# Patient Record
Sex: FEMALE | Race: WHITE | Employment: FULL TIME | URBAN - METROPOLITAN AREA
[De-identification: names, ages, dates, MRNs, and addresses within clinical notes are randomized per-mention and may not be internally consistent; named-entity substitution may affect disease eponyms.]

---

## 2017-04-17 ENCOUNTER — APPOINTMENT (OUTPATIENT)
Dept: ULTRASOUND IMAGING | Facility: HOSPITAL | Age: 27
End: 2017-04-17
Attending: NURSE PRACTITIONER
Payer: COMMERCIAL

## 2017-04-17 ENCOUNTER — HOSPITAL ENCOUNTER (OUTPATIENT)
Age: 27
Discharge: OTHER INSTITUTION | End: 2017-04-17
Attending: EMERGENCY MEDICINE
Payer: COMMERCIAL

## 2017-04-17 ENCOUNTER — APPOINTMENT (OUTPATIENT)
Dept: CT IMAGING | Facility: HOSPITAL | Age: 27
End: 2017-04-17
Attending: NURSE PRACTITIONER
Payer: COMMERCIAL

## 2017-04-17 ENCOUNTER — HOSPITAL ENCOUNTER (EMERGENCY)
Facility: HOSPITAL | Age: 27
Discharge: HOME | End: 2017-04-17
Payer: COMMERCIAL

## 2017-04-17 VITALS
TEMPERATURE: 98 F | OXYGEN SATURATION: 97 % | HEIGHT: 63 IN | RESPIRATION RATE: 19 BRPM | DIASTOLIC BLOOD PRESSURE: 78 MMHG | HEART RATE: 98 BPM | BODY MASS INDEX: 34.73 KG/M2 | WEIGHT: 196 LBS | SYSTOLIC BLOOD PRESSURE: 121 MMHG

## 2017-04-17 VITALS
SYSTOLIC BLOOD PRESSURE: 115 MMHG | HEIGHT: 63 IN | TEMPERATURE: 98 F | WEIGHT: 196 LBS | DIASTOLIC BLOOD PRESSURE: 67 MMHG | OXYGEN SATURATION: 99 % | BODY MASS INDEX: 34.73 KG/M2 | RESPIRATION RATE: 18 BRPM | HEART RATE: 91 BPM

## 2017-04-17 DIAGNOSIS — R10.9 LEFT SIDED ABDOMINAL PAIN OF UNKNOWN CAUSE: Primary | ICD-10-CM

## 2017-04-17 DIAGNOSIS — R10.9 ABDOMINAL PAIN, ACUTE: Primary | ICD-10-CM

## 2017-04-17 PROCEDURE — 99285 EMERGENCY DEPT VISIT HI MDM: CPT

## 2017-04-17 PROCEDURE — 93975 VASCULAR STUDY: CPT

## 2017-04-17 PROCEDURE — 87491 CHLMYD TRACH DNA AMP PROBE: CPT | Performed by: NURSE PRACTITIONER

## 2017-04-17 PROCEDURE — 96374 THER/PROPH/DIAG INJ IV PUSH: CPT

## 2017-04-17 PROCEDURE — 85025 COMPLETE CBC W/AUTO DIFF WBC: CPT | Performed by: NURSE PRACTITIONER

## 2017-04-17 PROCEDURE — 81025 URINE PREGNANCY TEST: CPT

## 2017-04-17 PROCEDURE — 81003 URINALYSIS AUTO W/O SCOPE: CPT | Performed by: NURSE PRACTITIONER

## 2017-04-17 PROCEDURE — 74176 CT ABD & PELVIS W/O CONTRAST: CPT

## 2017-04-17 PROCEDURE — 81002 URINALYSIS NONAUTO W/O SCOPE: CPT

## 2017-04-17 PROCEDURE — 99205 OFFICE O/P NEW HI 60 MIN: CPT

## 2017-04-17 PROCEDURE — 76830 TRANSVAGINAL US NON-OB: CPT

## 2017-04-17 PROCEDURE — 87591 N.GONORRHOEAE DNA AMP PROB: CPT | Performed by: NURSE PRACTITIONER

## 2017-04-17 PROCEDURE — 80048 BASIC METABOLIC PNL TOTAL CA: CPT | Performed by: NURSE PRACTITIONER

## 2017-04-17 PROCEDURE — 76856 US EXAM PELVIC COMPLETE: CPT

## 2017-04-17 RX ORDER — HYDROCODONE BITARTRATE AND ACETAMINOPHEN 5; 325 MG/1; MG/1
1-2 TABLET ORAL EVERY 4 HOURS PRN
Qty: 20 TABLET | Refills: 0 | Status: SHIPPED | OUTPATIENT
Start: 2017-04-17 | End: 2017-04-24

## 2017-04-17 RX ORDER — HYDROCODONE BITARTRATE AND ACETAMINOPHEN 5; 325 MG/1; MG/1
1 TABLET ORAL ONCE
Status: COMPLETED | OUTPATIENT
Start: 2017-04-17 | End: 2017-04-17

## 2017-04-17 RX ORDER — KETOROLAC TROMETHAMINE 30 MG/ML
30 INJECTION, SOLUTION INTRAMUSCULAR; INTRAVENOUS ONCE
Status: COMPLETED | OUTPATIENT
Start: 2017-04-17 | End: 2017-04-17

## 2017-04-17 RX ORDER — KETOROLAC TROMETHAMINE 30 MG/ML
INJECTION, SOLUTION INTRAMUSCULAR; INTRAVENOUS
Status: COMPLETED
Start: 2017-04-17 | End: 2017-04-17

## 2017-04-17 NOTE — ED NOTES
Received pt via ambulatory. Pt is A&OX4. Pt presents with LLQ pain x1 day. Last BM 4/17/17. Pt denies n/v/d. PT denies urinary complaints, or vaginal d/c. Pt resting comfortably in bed. NAD. Will continue to monitor.

## 2017-04-17 NOTE — ED PROVIDER NOTES
Patient Seen in: [facility]    History   Patient presents with:  Urinary Symptoms (urologic)    Stated Complaint: HEMATURIA/SIDE PAIN    HPI    Patient is here with mom.   Patient reports 1 week of left-sided abdominal derek Normocephalic and atraumatic. Right Ear: External ear normal.   Left Ear: External ear normal.   Eyes: Conjunctivae and EOM are normal.   Neck: Neck supple. No tracheal deviation present.    Cardiovascular: Normal rate, regular rhythm, normal heart sounds

## 2017-04-17 NOTE — ED NOTES
Pt being sent to ED. Report given to Britney Solano, triage RN at [address] [place] [address].

## 2017-04-17 NOTE — ED PROVIDER NOTES
Patient Seen in: [facility] AND [facility] Emergency Department    History   Patient presents with:  Abdomen/Flank Pain (GI/)    Stated Complaint: hematuria     HPI    Patient is here with mom.  Patient reports 1 week of left-sided abdominal pain flank to mid. 03/15/2017 (Exact Date)        Physical Exam   Constitutional: She is oriented to person, place, and time. She appears well-developed and well-nourished. HENT:   Head: Normocephalic and atraumatic.    Eyes: Conjunctivae and EOM are normal. Pupils are [place] RAINBOW DRAW DARK GREEN   RAINBOW DRAW LAVENDER TALL (BNP)   CHLAMYDIA/GONOCOCCUS, ROSLYN       MDM           US PELVIS EV W DOPPLER (OJC=[id number])+(CRD [id number]) (Final result) Result time: 04/17/17 15:34:03     Final result by Gunner Cohen MD (04/17/ cause  (primary encounter diagnosis)    Disposition:  Discharge    Follow-up:  Cherie Vera, [phone]    Schedule an appointment as soon as possible for a visit in 2 days  If symptoms worsen return to the ER      Me

## 2017-04-17 NOTE — ED INITIAL ASSESSMENT (HPI)
Hematuria started last night. +left flank pain intermittent x1 week. +nausea. Pt denies v/d. Pt denies pain with urination. Denies fevers.

## 2017-04-21 ENCOUNTER — TELEPHONE (OUTPATIENT)
Dept: GASTROENTEROLOGY | Facility: CLINIC | Age: 27
End: 2017-04-21

## 2017-04-21 ENCOUNTER — OFFICE VISIT (OUTPATIENT)
Dept: INTERNAL MEDICINE CLINIC | Facility: CLINIC | Age: 27
End: 2017-04-21

## 2017-04-21 VITALS
HEIGHT: 63 IN | HEART RATE: 99 BPM | DIASTOLIC BLOOD PRESSURE: 60 MMHG | BODY MASS INDEX: 34.55 KG/M2 | WEIGHT: 195 LBS | TEMPERATURE: 98 F | SYSTOLIC BLOOD PRESSURE: 94 MMHG | OXYGEN SATURATION: 98 %

## 2017-04-21 DIAGNOSIS — Z80.0 FAMILY HISTORY OF COLON CANCER: ICD-10-CM

## 2017-04-21 DIAGNOSIS — N28.1 PARAPELVIC RENAL CYST: ICD-10-CM

## 2017-04-21 DIAGNOSIS — I88.0 MESENTERIC ADENITIS: Primary | ICD-10-CM

## 2017-04-21 DIAGNOSIS — K52.9 COLITIS: ICD-10-CM

## 2017-04-21 PROCEDURE — 80076 HEPATIC FUNCTION PANEL: CPT | Performed by: INTERNAL MEDICINE

## 2017-04-21 PROCEDURE — 99203 OFFICE O/P NEW LOW 30 MIN: CPT | Performed by: INTERNAL MEDICINE

## 2017-04-21 PROCEDURE — 85652 RBC SED RATE AUTOMATED: CPT | Performed by: INTERNAL MEDICINE

## 2017-04-21 RX ORDER — DESOGESTREL AND ETHINYL ESTRADIOL 0.15-0.03
KIT ORAL
Refills: 3 | COMMUNITY
Start: 2017-03-29 | End: 2018-07-10

## 2017-04-21 RX ORDER — METRONIDAZOLE 500 MG/1
500 TABLET ORAL 3 TIMES DAILY
Qty: 21 TABLET | Refills: 0 | Status: SHIPPED | OUTPATIENT
Start: 2017-04-21 | End: 2017-04-28

## 2017-04-21 RX ORDER — CIPROFLOXACIN 500 MG/1
500 TABLET, FILM COATED ORAL 2 TIMES DAILY
Qty: 14 TABLET | Refills: 0 | Status: SHIPPED | OUTPATIENT
Start: 2017-04-21 | End: 2017-04-28

## 2017-04-21 NOTE — TELEPHONE ENCOUNTER
Spoke with pt. Referred for abdominal pain and abnormal CT scan copied below. Pt can only do morning and wants to see MD only. added to schedule next week      CONCLUSION:   1. Normal size kidneys. No renal calculi or significant perinephric stranding.

## 2017-04-21 NOTE — TELEPHONE ENCOUNTER
Pt called to request appt sooner than first available with GS for abdominal pain for last 2 wks. Pt saw Dr. Shruti Tidwell today and was told to be seen as soon as possible. Pt was in ER on 4/17/17 and had testing done. Please call.   Pt only is willing to se

## 2017-04-22 NOTE — PROGRESS NOTES
Larry White is a 27year old female.     Chief complaint:  [facility] follow up /abdominal pain   HPI:     27year old female with PMH as listed below here for [facility] follow up and abdominal pain  Patient reports pain  Started around 3-4 weeks ago   Started wit normocephalic,ears and throat are clear  NECK: supple,no adenopathy  LUNGS: clear to auscultation  CARDIO: RRR without murmur  GI: good BS's,no masses, HSM or tenderness  EXTREMITIES: no cyanosis, clubbing or edema  NEURO: no gross deficits             APR Hepatic Function Panel (7) [E]; Future  - Sed Rate, Westergren (Automated) [E]  - Hepatic Function Panel (7) [E]  - Clostridium difficile(toxigenic)PCR [E]; Future  - Hepatitis Panel, Acute (4) [E]; Future    3.  Family history of colon cancer  GI referral

## 2017-04-24 ENCOUNTER — TELEPHONE (OUTPATIENT)
Dept: INTERNAL MEDICINE CLINIC | Facility: CLINIC | Age: 27
End: 2017-04-24

## 2017-04-25 ENCOUNTER — TELEPHONE (OUTPATIENT)
Dept: INTERNAL MEDICINE CLINIC | Facility: CLINIC | Age: 27
End: 2017-04-25

## 2017-04-25 DIAGNOSIS — R74.8 ELEVATED LIVER ENZYMES: Primary | ICD-10-CM

## 2017-04-25 NOTE — TELEPHONE ENCOUNTER
Patient has appointment with GI on Thursday. She is having the labs drawn tomorrow after class and her abdominal pain has not gone away for now she is managing it.

## 2017-04-26 ENCOUNTER — LAB ENCOUNTER (OUTPATIENT)
Dept: LAB | Age: 27
End: 2017-04-26
Attending: INTERNAL MEDICINE
Payer: COMMERCIAL

## 2017-04-26 DIAGNOSIS — R74.8 ELEVATED LIVER ENZYMES: ICD-10-CM

## 2017-04-26 DIAGNOSIS — K52.9 COLITIS: ICD-10-CM

## 2017-04-26 DIAGNOSIS — I88.0 MESENTERIC ADENITIS: ICD-10-CM

## 2017-04-26 DIAGNOSIS — Z80.0 FAMILY HISTORY OF COLON CANCER: ICD-10-CM

## 2017-04-26 DIAGNOSIS — N28.1 PARAPELVIC RENAL CYST: ICD-10-CM

## 2017-04-26 PROCEDURE — 86664 EPSTEIN-BARR NUCLEAR ANTIGEN: CPT

## 2017-04-26 PROCEDURE — 80076 HEPATIC FUNCTION PANEL: CPT

## 2017-04-26 PROCEDURE — 86645 CMV ANTIBODY IGM: CPT

## 2017-04-26 PROCEDURE — 36415 COLL VENOUS BLD VENIPUNCTURE: CPT

## 2017-04-26 PROCEDURE — 86256 FLUORESCENT ANTIBODY TITER: CPT

## 2017-04-26 PROCEDURE — 86665 EPSTEIN-BARR CAPSID VCA: CPT

## 2017-04-26 PROCEDURE — 80074 ACUTE HEPATITIS PANEL: CPT

## 2017-04-26 PROCEDURE — 86039 ANTINUCLEAR ANTIBODIES (ANA): CPT

## 2017-04-26 PROCEDURE — 86038 ANTINUCLEAR ANTIBODIES: CPT

## 2017-04-27 ENCOUNTER — OFFICE VISIT (OUTPATIENT)
Dept: GASTROENTEROLOGY | Facility: CLINIC | Age: 27
End: 2017-04-27

## 2017-04-27 VITALS
TEMPERATURE: 98 F | HEIGHT: 63 IN | HEART RATE: 50 BPM | DIASTOLIC BLOOD PRESSURE: 66 MMHG | WEIGHT: 192 LBS | RESPIRATION RATE: 16 BRPM | SYSTOLIC BLOOD PRESSURE: 94 MMHG | BODY MASS INDEX: 34.02 KG/M2

## 2017-04-27 DIAGNOSIS — R74.8 ELEVATED LIVER ENZYMES: ICD-10-CM

## 2017-04-27 DIAGNOSIS — R10.12 LEFT UPPER QUADRANT PAIN: Primary | ICD-10-CM

## 2017-04-27 DIAGNOSIS — R93.3 ABNORMAL CT SCAN, COLON: ICD-10-CM

## 2017-04-27 PROCEDURE — 99213 OFFICE O/P EST LOW 20 MIN: CPT | Performed by: INTERNAL MEDICINE

## 2017-04-27 PROCEDURE — 99203 OFFICE O/P NEW LOW 30 MIN: CPT | Performed by: INTERNAL MEDICINE

## 2017-04-28 NOTE — PATIENT INSTRUCTIONS
1.  Complete antibiotics. 2.  We should repeat blood test next week. 3.  Please contact me with any changes.

## 2017-04-28 NOTE — PROGRESS NOTES
HPI:    Patient ID: Rosita Martin is a 27year old female. HPI  The patient is seen today with her mother at the request of Dr. Laurent Sarabia. She embarked on dietary changes at the advice of her  which included a fast during the day.   A years.    She does not smoke cigarettes. She smokes cannabis on a daily basis. No alcohol. Her father  of small cell cancer. Grandfather had colon cancer in his 62s. A great aunt and great grandfather had colon cancer.   Grandmother had breast ca reviewed.     Component      Latest Ref Rng 4/26/2017 4/21/2017   WBC      4.0-11.0 K/UL     RBC      3.70-5.40 M/UL     Hemoglobin      12.0-16.0 g/dL     Hematocrit      35.0-48.0 %     MCV      80.0-100.0 fL     MCH      27.0-32.0 pg     MCHC      32.0-3 RATE      0-20 mm/Hr  14     Component      Latest Ref Rng 4/17/2017   WBC      4.0-11.0 K/UL 9.2   RBC      3.70-5.40 M/UL 4.44   Hemoglobin      12.0-16.0 g/dL 12.7   Hematocrit      35.0-48.0 % 37.5   MCV      80.0-100.0 fL 84.6   MCH      27.0-32.0 pg INDICATIONS: Generalized abdominal pelvic pain. Left sided abdominal pain left mid abdomen and lower quadrant. Worsening left flank pain since yesterday with hematuria last night and this morning.  The   TECHNIQUE: CT images of the abdomen and pelvis were Unremarkable small   bowel loops without focal mass or obstruction. Distal and terminal ileum unremarkable. ABDOMINAL WALL: Small umbilical hernia containing fat   PELVIC NODES: No enlarged mass or adenopathy. PELVIC ORGANS: No visible mass.  Pelvic org Hepatitis serologies are negative. EBV titers are consistent with past infection. CMV titers are pending. CT findings are noted. I discussed with the patient and her mother that I suspect a viral syndrome as the cause of the patient's symptoms.   Sydney Kiran

## 2017-05-05 ENCOUNTER — APPOINTMENT (OUTPATIENT)
Dept: LAB | Facility: HOSPITAL | Age: 27
End: 2017-05-05
Attending: INTERNAL MEDICINE
Payer: COMMERCIAL

## 2017-05-05 DIAGNOSIS — I88.0 MESENTERIC ADENITIS: ICD-10-CM

## 2017-05-05 DIAGNOSIS — Z80.0 FAMILY HISTORY OF COLON CANCER: ICD-10-CM

## 2017-05-05 DIAGNOSIS — N28.1 PARAPELVIC RENAL CYST: ICD-10-CM

## 2017-05-05 DIAGNOSIS — K52.9 COLITIS: ICD-10-CM

## 2017-05-05 PROCEDURE — 80076 HEPATIC FUNCTION PANEL: CPT

## 2017-05-05 PROCEDURE — 36415 COLL VENOUS BLD VENIPUNCTURE: CPT

## 2017-05-05 PROCEDURE — 87389 HIV-1 AG W/HIV-1&-2 AB AG IA: CPT

## 2017-05-09 ENCOUNTER — TELEPHONE (OUTPATIENT)
Dept: INTERNAL MEDICINE CLINIC | Facility: CLINIC | Age: 27
End: 2017-05-09

## 2017-05-09 NOTE — TELEPHONE ENCOUNTER
----- Message from Andre Quinn MD sent at 5/9/2017 12:11 AM CDT -----  Can you please call the patient about negative HIV test .  thanks

## 2017-05-09 NOTE — TELEPHONE ENCOUNTER
Called patient per Dr Denette Castleman to in form her of normal results  D. O.B verified   Patient verbalized knowledge and understanding

## 2017-10-04 ENCOUNTER — OFFICE VISIT (OUTPATIENT)
Dept: INTERNAL MEDICINE CLINIC | Facility: CLINIC | Age: 27
End: 2017-10-04

## 2017-10-04 VITALS
TEMPERATURE: 98 F | HEART RATE: 85 BPM | RESPIRATION RATE: 16 BRPM | DIASTOLIC BLOOD PRESSURE: 60 MMHG | BODY MASS INDEX: 34.73 KG/M2 | HEIGHT: 63 IN | SYSTOLIC BLOOD PRESSURE: 110 MMHG | OXYGEN SATURATION: 99 % | WEIGHT: 196 LBS

## 2017-10-04 DIAGNOSIS — M54.42 ACUTE LEFT-SIDED LOW BACK PAIN WITH LEFT-SIDED SCIATICA: Primary | ICD-10-CM

## 2017-10-04 PROCEDURE — 99213 OFFICE O/P EST LOW 20 MIN: CPT | Performed by: INTERNAL MEDICINE

## 2017-10-04 RX ORDER — METHYLPREDNISOLONE 4 MG/1
TABLET ORAL
Qty: 1 KIT | Refills: 0 | Status: SHIPPED | OUTPATIENT
Start: 2017-10-04 | End: 2017-10-10 | Stop reason: ALTCHOICE

## 2017-10-04 RX ORDER — HYDROCODONE BITARTRATE AND ACETAMINOPHEN 7.5; 325 MG/1; MG/1
1 TABLET ORAL EVERY 4 HOURS PRN
Qty: 30 TABLET | Refills: 0 | Status: SHIPPED | OUTPATIENT
Start: 2017-10-04 | End: 2017-11-03

## 2017-10-04 NOTE — PROGRESS NOTES
HPI:    Patient ID: Mendel Coop is a 27year old female. HPI   Presents today c/o low back pain for 2 weeks. Patient is a nanny. She also goes to gym regularly. Experienced   sudden onset of low back pain 2 weeks ago while moving.  Pain is localiz Pulmonary/Chest: Effort normal and breath sounds normal.   Abdominal: Soft. Bowel sounds are normal.   Musculoskeletal: She exhibits no edema. Tender left paralumbar and gluteal area. Straight leg + at 60 degree on the left.  Motor 5/5 bilateral. Mild h

## 2017-10-10 ENCOUNTER — OFFICE VISIT (OUTPATIENT)
Dept: INTERNAL MEDICINE CLINIC | Facility: CLINIC | Age: 27
End: 2017-10-10

## 2017-10-10 VITALS
HEIGHT: 63 IN | HEART RATE: 103 BPM | DIASTOLIC BLOOD PRESSURE: 60 MMHG | SYSTOLIC BLOOD PRESSURE: 130 MMHG | BODY MASS INDEX: 34.55 KG/M2 | OXYGEN SATURATION: 100 % | WEIGHT: 195 LBS | RESPIRATION RATE: 17 BRPM | TEMPERATURE: 99 F

## 2017-10-10 DIAGNOSIS — M54.42 ACUTE LEFT-SIDED LOW BACK PAIN WITH LEFT-SIDED SCIATICA: Primary | ICD-10-CM

## 2017-10-10 PROCEDURE — 99213 OFFICE O/P EST LOW 20 MIN: CPT | Performed by: INTERNAL MEDICINE

## 2017-10-10 RX ORDER — IBUPROFEN 600 MG/1
600 TABLET ORAL EVERY 6 HOURS PRN
Qty: 45 TABLET | Refills: 0 | Status: SHIPPED | OUTPATIENT
Start: 2017-10-10

## 2017-10-10 RX ORDER — CYCLOBENZAPRINE HCL 10 MG
10 TABLET ORAL NIGHTLY
Qty: 15 TABLET | Refills: 1 | Status: SHIPPED | OUTPATIENT
Start: 2017-10-10 | End: 2017-10-30

## 2017-10-10 NOTE — PROGRESS NOTES
HPI:    Patient ID: Sangita Renteria is a 27year old female. HPI    Here for follow up of low back pain. Pain was relieved for 2 days since last visit. No longer have numbness of left thigh.  Pain recurred again , \" shooting pin  Around left gluteal a Abdominal: Soft. Bowel sounds are normal.   Musculoskeletal: She exhibits no edema. Tender left gluteal area. No lumbar spine tenderness. Straight leg positive at 45 degree. No motor, sensory deficit.     Neurological: She is alert and oriented to pers

## 2017-10-19 ENCOUNTER — TELEPHONE (OUTPATIENT)
Dept: GASTROENTEROLOGY | Facility: CLINIC | Age: 27
End: 2017-10-19

## 2017-10-19 DIAGNOSIS — R74.8 ELEVATED LIVER ENZYMES: Primary | ICD-10-CM

## 2017-10-19 NOTE — TELEPHONE ENCOUNTER
----- Message from Anai Botello MD sent at 10/19/2017 10:47 AM CDT -----  GI RN staff:  I dictated a letter to the patient. Please also send by [address] mail as I am not sure that she looks at her My Chart e mail.

## 2017-10-20 ENCOUNTER — OFFICE VISIT (OUTPATIENT)
Dept: INTERNAL MEDICINE CLINIC | Facility: CLINIC | Age: 27
End: 2017-10-20

## 2017-10-20 ENCOUNTER — HOSPITAL ENCOUNTER (OUTPATIENT)
Dept: GENERAL RADIOLOGY | Age: 27
Discharge: HOME | End: 2017-10-20
Attending: INTERNAL MEDICINE
Payer: COMMERCIAL

## 2017-10-20 VITALS
HEIGHT: 63 IN | BODY MASS INDEX: 35.26 KG/M2 | TEMPERATURE: 99 F | RESPIRATION RATE: 12 BRPM | DIASTOLIC BLOOD PRESSURE: 70 MMHG | OXYGEN SATURATION: 98 % | HEART RATE: 60 BPM | SYSTOLIC BLOOD PRESSURE: 118 MMHG | WEIGHT: 199 LBS

## 2017-10-20 DIAGNOSIS — M54.42 ACUTE LEFT-SIDED LOW BACK PAIN WITH LEFT-SIDED SCIATICA: ICD-10-CM

## 2017-10-20 DIAGNOSIS — M54.42 ACUTE LEFT-SIDED LOW BACK PAIN WITH LEFT-SIDED SCIATICA: Primary | ICD-10-CM

## 2017-10-20 PROCEDURE — 96372 THER/PROPH/DIAG INJ SC/IM: CPT | Performed by: INTERNAL MEDICINE

## 2017-10-20 PROCEDURE — 99214 OFFICE O/P EST MOD 30 MIN: CPT | Performed by: INTERNAL MEDICINE

## 2017-10-20 PROCEDURE — 72100 X-RAY EXAM L-S SPINE 2/3 VWS: CPT | Performed by: INTERNAL MEDICINE

## 2017-10-20 RX ORDER — KETOROLAC TROMETHAMINE 30 MG/ML
30 INJECTION, SOLUTION INTRAMUSCULAR; INTRAVENOUS ONCE
Status: COMPLETED | OUTPATIENT
Start: 2017-10-20 | End: 2017-10-20

## 2017-10-20 RX ORDER — MELOXICAM 15 MG/1
15 TABLET ORAL DAILY PRN
Qty: 30 TABLET | Refills: 0 | Status: SHIPPED | OUTPATIENT
Start: 2017-10-20 | End: 2018-03-27

## 2017-10-20 RX ADMIN — KETOROLAC TROMETHAMINE 30 MG: 30 INJECTION, SOLUTION INTRAMUSCULAR; INTRAVENOUS at 14:20:00

## 2017-10-20 RX ADMIN — KETOROLAC TROMETHAMINE 30 MG: 30 INJECTION, SOLUTION INTRAMUSCULAR; INTRAVENOUS at 14:22:00

## 2017-10-20 NOTE — PATIENT INSTRUCTIONS
Sciatica    Sciatica is a condition that causes pain in the lower back that spreads down into the buttock, hip, and leg. Sometimes the leg pain can happen without any back pain.  Sciatica happens when a spinal nerve is irritated or has pressure put on it · When in bed, try to find a position that is comfortable. A firm mattress is best. Try lying flat on your back with pillows under your knees. You can also try lying on your side with your knees bent up toward your chest and a pillow between your knees.   · © 3899-9208 [employer] [address]. [address] [facility] [place]. All rights reserved. This information is not intended as a substitute for professional medical care. Always follow your healthcare professional's instructions.

## 2017-10-20 NOTE — PROGRESS NOTES
Liyah Santos is a 27year old female.     Chief complaint: back pain and left leg pain    HPI:   27year old male with PMH as listed below here for back pain and left leg pain   Started 1 month ago   Back shooting down to left side   Left leg numbness atraumatic, normocephalic,ears and throat are clear  NECK: supple,no adenopathy  LUNGS: clear to auscultation  CARDIO: RRR without murmur  GI: good BS's,no masses, HSM or tenderness  EXTREMITIES: no cyanosis, clubbing or edema  NEURO: no gross deficits

## 2018-03-19 ENCOUNTER — TELEPHONE (OUTPATIENT)
Dept: GASTROENTEROLOGY | Facility: CLINIC | Age: 28
End: 2018-03-19

## 2018-03-19 NOTE — TELEPHONE ENCOUNTER
Left Voicemail andMailed letter to patient notifying her of overdue labs (Hepatic function Panel, MAZIN ,Direct screen ) Ordered 10/19/17  and due 11/1/17, 12/1/17, 2/19/18 . Overdue letter mailed to patient.

## 2018-03-27 ENCOUNTER — OFFICE VISIT (OUTPATIENT)
Dept: INTERNAL MEDICINE CLINIC | Facility: CLINIC | Age: 28
End: 2018-03-27

## 2018-03-27 VITALS
TEMPERATURE: 99 F | HEIGHT: 63 IN | SYSTOLIC BLOOD PRESSURE: 116 MMHG | HEART RATE: 103 BPM | DIASTOLIC BLOOD PRESSURE: 76 MMHG | BODY MASS INDEX: 36.43 KG/M2 | WEIGHT: 205.63 LBS | OXYGEN SATURATION: 99 %

## 2018-03-27 DIAGNOSIS — J45.901 ASTHMATIC BRONCHITIS WITH ACUTE EXACERBATION, UNSPECIFIED ASTHMA SEVERITY, UNSPECIFIED WHETHER PERSISTENT: Primary | ICD-10-CM

## 2018-03-27 DIAGNOSIS — J01.90 ACUTE SINUSITIS, RECURRENCE NOT SPECIFIED, UNSPECIFIED LOCATION: ICD-10-CM

## 2018-03-27 DIAGNOSIS — J45.21 MILD INTERMITTENT ASTHMA WITH ACUTE EXACERBATION: ICD-10-CM

## 2018-03-27 PROCEDURE — 99213 OFFICE O/P EST LOW 20 MIN: CPT | Performed by: INTERNAL MEDICINE

## 2018-03-27 RX ORDER — METHYLPREDNISOLONE 4 MG/1
TABLET ORAL
Qty: 1 KIT | Refills: 0 | Status: SHIPPED | OUTPATIENT
Start: 2018-03-27 | End: 2018-12-04 | Stop reason: ALTCHOICE

## 2018-03-27 RX ORDER — CODEINE PHOSPHATE AND GUAIFENESIN 10; 100 MG/5ML; MG/5ML
10 SOLUTION ORAL 3 TIMES DAILY PRN
Qty: 118 ML | Refills: 0 | Status: SHIPPED | OUTPATIENT
Start: 2018-03-27 | End: 2018-12-04 | Stop reason: ALTCHOICE

## 2018-03-27 RX ORDER — AZITHROMYCIN 250 MG/1
TABLET, FILM COATED ORAL
Qty: 6 TABLET | Refills: 0 | Status: SHIPPED | OUTPATIENT
Start: 2018-03-27 | End: 2018-12-04 | Stop reason: ALTCHOICE

## 2018-03-27 RX ORDER — ALBUTEROL SULFATE 90 UG/1
2 AEROSOL, METERED RESPIRATORY (INHALATION) EVERY 6 HOURS PRN
Qty: 1 INHALER | Refills: 2 | Status: SHIPPED | OUTPATIENT
Start: 2018-03-27 | End: 2018-06-11

## 2018-04-20 NOTE — TELEPHONE ENCOUNTER
From: Vandana Arizmendi  Sent: 4/20/2018 6:14 AM CDT  Subject: Medication Renewal Request    Vandana Arizmendi would like a refill of the following medications:     azithromycin (ZITHROMAX Z-GAYLE) 250 MG Oral Tab Matthew Landeros MD]     methylPREDNISolone (MEDROL) 4 MG Oral Tablet Therapy Pack Matthew Landeros MD]    Preferred pharmacy:  [id number] [address], [address] [phone], [phone]

## 2018-04-23 RX ORDER — AZITHROMYCIN 250 MG/1
TABLET, FILM COATED ORAL
Qty: 6 TABLET | Refills: 0
Start: 2018-04-23

## 2018-04-23 RX ORDER — METHYLPREDNISOLONE 4 MG/1
TABLET ORAL
Qty: 1 KIT | Refills: 0
Start: 2018-04-23

## 2018-05-01 ENCOUNTER — TELEPHONE (OUTPATIENT)
Dept: INTERNAL MEDICINE CLINIC | Facility: CLINIC | Age: 28
End: 2018-05-01

## 2018-05-01 RX ORDER — AMOXICILLIN AND CLAVULANATE POTASSIUM 875; 125 MG/1; MG/1
1 TABLET, FILM COATED ORAL 2 TIMES DAILY
Qty: 20 TABLET | Refills: 0 | Status: SHIPPED | OUTPATIENT
Start: 2018-05-01 | End: 2018-05-09

## 2018-05-01 RX ORDER — METHYLPREDNISOLONE 4 MG/1
TABLET ORAL
Qty: 1 KIT | Refills: 0
Start: 2018-05-01

## 2018-05-01 RX ORDER — METHYLPREDNISOLONE 4 MG/1
TABLET ORAL
Qty: 1 KIT | Refills: 0 | Status: SHIPPED | OUTPATIENT
Start: 2018-05-01 | End: 2018-12-04 | Stop reason: ALTCHOICE

## 2018-05-01 RX ORDER — AZITHROMYCIN 250 MG/1
TABLET, FILM COATED ORAL
Qty: 6 TABLET | Refills: 0
Start: 2018-05-01

## 2018-05-01 NOTE — TELEPHONE ENCOUNTER
From: Caryn Garcia  Sent: 5/1/2018 6:19 AM CDT  Subject: Medication Renewal Request    Caryn Garcia would like a refill of the following medications:     azithromycin (ZITHROMAX Z-GAYLE) 250 MG Oral Tab Rhonda Bass MD]     methylPREDNISolone (MEDROL) 4 MG Oral Tablet Therapy Pack Rhonda Bass MD]    Preferred pharmacy: [id number] [place] [facility] [address] [phone], [phone]

## 2018-07-10 ENCOUNTER — OFFICE VISIT (OUTPATIENT)
Dept: OBGYN CLINIC | Facility: CLINIC | Age: 28
End: 2018-07-10

## 2018-07-10 VITALS
WEIGHT: 214.81 LBS | HEIGHT: 62.5 IN | HEART RATE: 79 BPM | SYSTOLIC BLOOD PRESSURE: 123 MMHG | BODY MASS INDEX: 38.54 KG/M2 | DIASTOLIC BLOOD PRESSURE: 80 MMHG

## 2018-07-10 DIAGNOSIS — Z80.0 FAMILY HISTORY OF COLON CANCER: ICD-10-CM

## 2018-07-10 DIAGNOSIS — Z11.3 SCREEN FOR STD (SEXUALLY TRANSMITTED DISEASE): ICD-10-CM

## 2018-07-10 DIAGNOSIS — Z12.4 CERVICAL CANCER SCREENING: ICD-10-CM

## 2018-07-10 DIAGNOSIS — Z01.419 ENCOUNTER FOR GYNECOLOGICAL EXAMINATION WITHOUT ABNORMAL FINDING: Primary | ICD-10-CM

## 2018-07-10 DIAGNOSIS — K52.9 COLITIS: ICD-10-CM

## 2018-07-10 DIAGNOSIS — N28.1 PARAPELVIC RENAL CYST: ICD-10-CM

## 2018-07-10 DIAGNOSIS — I88.0 MESENTERIC ADENITIS: ICD-10-CM

## 2018-07-10 PROCEDURE — 99385 PREV VISIT NEW AGE 18-39: CPT | Performed by: OBSTETRICS & GYNECOLOGY

## 2018-07-10 RX ORDER — DESOGESTREL AND ETHINYL ESTRADIOL 0.15-0.03
1 KIT ORAL DAILY
Qty: 3 PACKAGE | Refills: 4 | Status: SHIPPED | OUTPATIENT
Start: 2018-07-10 | End: 2020-09-26 | Stop reason: ALTCHOICE

## 2018-07-11 LAB
C TRACH DNA SPEC QL NAA+PROBE: NEGATIVE
N GONORRHOEA DNA SPEC QL NAA+PROBE: NEGATIVE
T VAGINALIS RRNA SPEC QL NAA+PROBE: NEGATIVE

## 2018-12-04 NOTE — PROGRESS NOTES
José Luis Ahr is a 24year old female.     Chief complaint:   anxiety ad depression   HPI:     24year old female with PMH as listed below here for   Anxiety and depression   Has been having worsening symptoms   Few month ago  Worse with winter    No pr Comment: marijuana daily       Family History   Problem Relation Age of Onset   • Cancer Father         small cell carcinoma   • Cancer Maternal Grandfather         colon cancer   • Cancer Paternal Grandmother         breast   • Breast Cancer Maternal Aunt Obesity Medicine

## 2019-06-11 DIAGNOSIS — Z00.00 EXAMINATION: ICD-10-CM

## 2019-06-11 DIAGNOSIS — F32.A ANXIETY AND DEPRESSION: ICD-10-CM

## 2019-06-11 DIAGNOSIS — F41.9 ANXIETY AND DEPRESSION: ICD-10-CM

## 2019-06-11 DIAGNOSIS — IMO0001 MODERATE INTERMITTENT ASTHMA WITHOUT COMPLICATION: ICD-10-CM

## 2019-06-12 RX ORDER — ALPRAZOLAM 0.25 MG/1
0.25 TABLET ORAL EVERY 6 HOURS PRN
Qty: 30 TABLET | Refills: 1 | Status: SHIPPED | OUTPATIENT
Start: 2019-06-12 | End: 2020-09-26 | Stop reason: ALTCHOICE

## 2020-02-17 ENCOUNTER — HOSPITAL ENCOUNTER (OUTPATIENT)
Age: 30
Discharge: HOME | End: 2020-02-17
Attending: EMERGENCY MEDICINE
Payer: MEDICAID

## 2020-02-17 ENCOUNTER — APPOINTMENT (OUTPATIENT)
Dept: GENERAL RADIOLOGY | Age: 30
End: 2020-02-17
Attending: EMERGENCY MEDICINE
Payer: MEDICAID

## 2020-02-17 VITALS
HEART RATE: 85 BPM | TEMPERATURE: 98 F | SYSTOLIC BLOOD PRESSURE: 110 MMHG | BODY MASS INDEX: 35.44 KG/M2 | OXYGEN SATURATION: 100 % | WEIGHT: 200 LBS | HEIGHT: 63 IN | DIASTOLIC BLOOD PRESSURE: 73 MMHG | RESPIRATION RATE: 20 BRPM

## 2020-02-17 DIAGNOSIS — M25.561 ACUTE PAIN OF RIGHT KNEE: Primary | ICD-10-CM

## 2020-02-17 PROCEDURE — 73560 X-RAY EXAM OF KNEE 1 OR 2: CPT | Performed by: EMERGENCY MEDICINE

## 2020-02-17 PROCEDURE — 99214 OFFICE O/P EST MOD 30 MIN: CPT

## 2020-02-17 PROCEDURE — 99213 OFFICE O/P EST LOW 20 MIN: CPT

## 2020-02-17 NOTE — ED PROVIDER NOTES
Patient Seen in: [address] [facility] [address]      History   Patient presents with:  Musculoskeletal Problem    Stated Complaint: FALL RT KNEE PAIN     HPI    This patient states she injured her right knee yesterday twisting the knee.   She not warm to palpation.   The patient has intact pulses in the leg    icc Course   The patient was fitted with a knee immobilizer and crutches     Xr Knee (1 Or 2 Views), Right (cpt=73560)    Result Date: 2/17/2020  PROCEDURE: XR KNEE (1 OR 2 VIEWS), RIGHT (

## 2020-06-22 ENCOUNTER — TELEMEDICINE (OUTPATIENT)
Dept: INTERNAL MEDICINE CLINIC | Facility: CLINIC | Age: 30
End: 2020-06-22

## 2020-06-22 DIAGNOSIS — Z87.19 HISTORY OF COLITIS: ICD-10-CM

## 2020-06-22 DIAGNOSIS — R59.0 ABDOMINAL LYMPHADENOPATHY: ICD-10-CM

## 2020-06-22 DIAGNOSIS — R76.8 ANA POSITIVE: ICD-10-CM

## 2020-06-22 DIAGNOSIS — R10.9 ABDOMINAL PAIN, UNSPECIFIED ABDOMINAL LOCATION: Primary | ICD-10-CM

## 2020-06-22 DIAGNOSIS — J45.20 MILD INTERMITTENT ASTHMA WITHOUT COMPLICATION: ICD-10-CM

## 2020-06-22 DIAGNOSIS — K52.9 COLITIS: ICD-10-CM

## 2020-06-22 PROCEDURE — 99214 OFFICE O/P EST MOD 30 MIN: CPT | Performed by: INTERNAL MEDICINE

## 2020-06-22 RX ORDER — ALBUTEROL SULFATE 90 UG/1
2 AEROSOL, METERED RESPIRATORY (INHALATION) EVERY 6 HOURS PRN
Qty: 1 INHALER | Refills: 2 | Status: SHIPPED | OUTPATIENT
Start: 2020-06-22

## 2020-06-22 NOTE — PROGRESS NOTES
This visit was conducted using telemedicine with live interactive video and audio   Patient verbally consents to conduct video visit   Patient understands and accepts financial responsibility for any deductible, co-insurance and/or co-pays associated with Lifestyle      Physical activity:        Days per week: Not on file        Minutes per session: Not on file      Stress: Not on file    Relationships      Social connections:        Talks on phone: Not on file        Gets together: Not on file        Atten breast   • Breast Cancer Maternal Aunt          REVIEW OF SYSTEMS:    A comprehensive 10 point review of systems was completed. Pertinent positives and negatives noted in the the HPI.      EXAM was conducted through video     General: She is not in Future  - MAZIN,DIRECT,REFLEX TITER + SPECIFIC ANTIBODIES; Future  - URINALYSIS WITH CULTURE REFLEX; Future    3. Abdominal lymphadenopathy  CT scan follow up   cbc  - Albuterol Sulfate  (90 Base) MCG/ACT Inhalation Aero Soln;  Inhale 2 puffs into the above.  Coding/billing information is submitted for this visit based on complexity of care and/or time spent for the visit.

## 2020-07-09 ENCOUNTER — TELEPHONE (OUTPATIENT)
Dept: INTERNAL MEDICINE CLINIC | Facility: CLINIC | Age: 30
End: 2020-07-09

## 2020-07-09 NOTE — TELEPHONE ENCOUNTER
[facility]# [id number]  E/D 7/8/20 - 01/04/2020  CPT 19757  CT ABDOMEN+PELVIS (ALL CNTRST ONLY)

## 2020-07-11 ENCOUNTER — HOSPITAL ENCOUNTER (OUTPATIENT)
Dept: CT IMAGING | Age: 30
Discharge: HOME | End: 2020-07-11
Attending: INTERNAL MEDICINE
Payer: MEDICAID

## 2020-07-11 ENCOUNTER — LAB ENCOUNTER (OUTPATIENT)
Dept: LAB | Facility: REFERENCE LAB | Age: 30
End: 2020-07-11
Attending: INTERNAL MEDICINE
Payer: MEDICAID

## 2020-07-11 DIAGNOSIS — R10.9 ABDOMINAL PAIN, UNSPECIFIED ABDOMINAL LOCATION: ICD-10-CM

## 2020-07-11 DIAGNOSIS — R59.0 ABDOMINAL LYMPHADENOPATHY: ICD-10-CM

## 2020-07-11 DIAGNOSIS — K52.9 COLITIS: ICD-10-CM

## 2020-07-11 DIAGNOSIS — Z87.19 HISTORY OF COLITIS: ICD-10-CM

## 2020-07-11 DIAGNOSIS — R76.8 ANA POSITIVE: ICD-10-CM

## 2020-07-11 LAB
ALBUMIN SERPL-MCNC: 3.9 G/DL (ref 3.4–5)
ALBUMIN/GLOB SERPL: 1 {RATIO} (ref 1–2)
ALP LIVER SERPL-CCNC: 74 U/L (ref 37–98)
ALT SERPL-CCNC: 34 U/L (ref 13–56)
ANION GAP SERPL CALC-SCNC: 4 MMOL/L (ref 0–18)
AST SERPL-CCNC: 15 U/L (ref 15–37)
BASOPHILS # BLD AUTO: 0.08 X10(3) UL (ref 0–0.2)
BASOPHILS NFR BLD AUTO: 0.9 %
BILIRUB SERPL-MCNC: 0.5 MG/DL (ref 0.1–2)
BILIRUB UR QL: NEGATIVE
BUN BLD-MCNC: 12 MG/DL (ref 7–18)
BUN/CREAT SERPL: 12.8 (ref 10–20)
CALCIUM BLD-MCNC: 9 MG/DL (ref 8.5–10.1)
CHLORIDE SERPL-SCNC: 104 MMOL/L (ref 98–112)
CLARITY UR: CLEAR
CO2 SERPL-SCNC: 28 MMOL/L (ref 21–32)
COLOR UR: YELLOW
CREAT BLD-MCNC: 0.94 MG/DL (ref 0.55–1.02)
CREAT BLD-MCNC: 1 MG/DL (ref 0.55–1.02)
CRP SERPL-MCNC: 0.85 MG/DL (ref ?–0.3)
DEPRECATED RDW RBC AUTO: 39 FL (ref 35.1–46.3)
EOSINOPHIL # BLD AUTO: 0.19 X10(3) UL (ref 0–0.7)
EOSINOPHIL NFR BLD AUTO: 2.1 %
ERYTHROCYTE [DISTWIDTH] IN BLOOD BY AUTOMATED COUNT: 11.9 % (ref 11–15)
ERYTHROCYTE [SEDIMENTATION RATE] IN BLOOD: 9 MM/HR (ref 0–20)
GLOBULIN PLAS-MCNC: 4 G/DL (ref 2.8–4.4)
GLUCOSE BLD-MCNC: 91 MG/DL (ref 70–99)
GLUCOSE UR-MCNC: NEGATIVE MG/DL
HCT VFR BLD AUTO: 41.3 % (ref 35–48)
HGB BLD-MCNC: 13.9 G/DL (ref 12–16)
HGB UR QL STRIP.AUTO: NEGATIVE
IMM GRANULOCYTES # BLD AUTO: 0.02 X10(3) UL (ref 0–1)
IMM GRANULOCYTES NFR BLD: 0.2 %
KETONES UR-MCNC: NEGATIVE MG/DL
LYMPHOCYTES # BLD AUTO: 1.73 X10(3) UL (ref 1–4)
LYMPHOCYTES NFR BLD AUTO: 19 %
M PROTEIN MFR SERPL ELPH: 7.9 G/DL (ref 6.4–8.2)
MCH RBC QN AUTO: 30.3 PG (ref 26–34)
MCHC RBC AUTO-ENTMCNC: 33.7 G/DL (ref 31–37)
MCV RBC AUTO: 90 FL (ref 80–100)
MONOCYTES # BLD AUTO: 0.58 X10(3) UL (ref 0.1–1)
MONOCYTES NFR BLD AUTO: 6.4 %
NEUTROPHILS # BLD AUTO: 6.49 X10 (3) UL (ref 1.5–7.7)
NEUTROPHILS # BLD AUTO: 6.49 X10(3) UL (ref 1.5–7.7)
NEUTROPHILS NFR BLD AUTO: 71.4 %
NITRITE UR QL STRIP.AUTO: NEGATIVE
OSMOLALITY SERPL CALC.SUM OF ELEC: 281 MOSM/KG (ref 275–295)
PATIENT FASTING Y/N/NP: YES
PH UR: 6 [PH] (ref 5–8)
PLATELET # BLD AUTO: 311 10(3)UL (ref 150–450)
POTASSIUM SERPL-SCNC: 4.1 MMOL/L (ref 3.5–5.1)
PROT UR-MCNC: NEGATIVE MG/DL
RBC # BLD AUTO: 4.59 X10(6)UL (ref 3.8–5.3)
RBC #/AREA URNS AUTO: <1 /HPF
SODIUM SERPL-SCNC: 136 MMOL/L (ref 136–145)
SP GR UR STRIP: 1.02 (ref 1–1.03)
UROBILINOGEN UR STRIP-ACNC: <2
WBC # BLD AUTO: 9.1 X10(3) UL (ref 4–11)
WBC #/AREA URNS AUTO: 2 /HPF

## 2020-07-11 PROCEDURE — 74177 CT ABD & PELVIS W/CONTRAST: CPT | Performed by: INTERNAL MEDICINE

## 2020-07-11 PROCEDURE — 86039 ANTINUCLEAR ANTIBODIES (ANA): CPT

## 2020-07-11 PROCEDURE — 85652 RBC SED RATE AUTOMATED: CPT

## 2020-07-11 PROCEDURE — 86225 DNA ANTIBODY NATIVE: CPT

## 2020-07-11 PROCEDURE — 86235 NUCLEAR ANTIGEN ANTIBODY: CPT

## 2020-07-11 PROCEDURE — 86140 C-REACTIVE PROTEIN: CPT

## 2020-07-11 PROCEDURE — 86038 ANTINUCLEAR ANTIBODIES: CPT

## 2020-07-11 PROCEDURE — 36415 COLL VENOUS BLD VENIPUNCTURE: CPT

## 2020-07-11 PROCEDURE — 82565 ASSAY OF CREATININE: CPT

## 2020-07-11 PROCEDURE — 85025 COMPLETE CBC W/AUTO DIFF WBC: CPT

## 2020-07-11 PROCEDURE — 81001 URINALYSIS AUTO W/SCOPE: CPT

## 2020-07-11 PROCEDURE — 80053 COMPREHEN METABOLIC PANEL: CPT

## 2020-07-13 LAB — NUCLEAR IGG TITR SER IF: POSITIVE {TITER}

## 2020-07-13 RX ORDER — AMOXICILLIN AND CLAVULANATE POTASSIUM 875; 125 MG/1; MG/1
1 TABLET, FILM COATED ORAL 2 TIMES DAILY
Qty: 6 TABLET | Refills: 0 | Status: SHIPPED | OUTPATIENT
Start: 2020-07-13 | End: 2020-07-16

## 2020-07-14 LAB
DSDNA AB TITR SER: <10 {TITER}
ENA SM IGG SER QL: NEGATIVE
ENA SM+RNP AB SER QL: NEGATIVE
ENA SS-A AB SER QL IA: NEGATIVE
ENA SS-B AB SER QL IA: NEGATIVE

## 2020-07-15 LAB — ANA NUCLEOLAR TITR SER IF: 320 {TITER}

## 2020-07-16 DIAGNOSIS — R10.9 ABDOMINAL PAIN, UNSPECIFIED ABDOMINAL LOCATION: Primary | ICD-10-CM

## 2020-07-16 DIAGNOSIS — R76.8 POSITIVE ANA (ANTINUCLEAR ANTIBODY): Primary | ICD-10-CM

## 2020-08-13 DIAGNOSIS — N28.1 PARAPELVIC RENAL CYST: ICD-10-CM

## 2020-08-13 DIAGNOSIS — K52.9 COLITIS: ICD-10-CM

## 2020-08-13 DIAGNOSIS — Z80.0 FAMILY HISTORY OF COLON CANCER: ICD-10-CM

## 2020-08-13 DIAGNOSIS — I88.0 MESENTERIC ADENITIS: ICD-10-CM

## 2020-08-14 ENCOUNTER — PATIENT MESSAGE (OUTPATIENT)
Dept: INTERNAL MEDICINE CLINIC | Facility: CLINIC | Age: 30
End: 2020-08-14

## 2020-08-14 ENCOUNTER — HOSPITAL ENCOUNTER (OUTPATIENT)
Dept: GENERAL RADIOLOGY | Facility: HOSPITAL | Age: 30
Discharge: HOME | End: 2020-08-14
Attending: INTERNAL MEDICINE
Payer: MEDICAID

## 2020-08-14 ENCOUNTER — OFFICE VISIT (OUTPATIENT)
Dept: RHEUMATOLOGY | Facility: CLINIC | Age: 30
End: 2020-08-14
Payer: MEDICAID

## 2020-08-14 ENCOUNTER — APPOINTMENT (OUTPATIENT)
Dept: LAB | Facility: HOSPITAL | Age: 30
End: 2020-08-14
Attending: INTERNAL MEDICINE
Payer: MEDICAID

## 2020-08-14 VITALS
WEIGHT: 195 LBS | DIASTOLIC BLOOD PRESSURE: 80 MMHG | HEART RATE: 101 BPM | SYSTOLIC BLOOD PRESSURE: 113 MMHG | HEIGHT: 63 IN | BODY MASS INDEX: 34.55 KG/M2

## 2020-08-14 DIAGNOSIS — G89.29 CHRONIC BILATERAL LOW BACK PAIN WITH LEFT-SIDED SCIATICA: ICD-10-CM

## 2020-08-14 DIAGNOSIS — M25.50 POLYARTHRALGIA: ICD-10-CM

## 2020-08-14 DIAGNOSIS — M54.42 CHRONIC BILATERAL LOW BACK PAIN WITH LEFT-SIDED SCIATICA: ICD-10-CM

## 2020-08-14 DIAGNOSIS — M25.50 POLYARTHRALGIA: Primary | ICD-10-CM

## 2020-08-14 PROCEDURE — 3074F SYST BP LT 130 MM HG: CPT | Performed by: INTERNAL MEDICINE

## 2020-08-14 PROCEDURE — 99204 OFFICE O/P NEW MOD 45 MIN: CPT | Performed by: INTERNAL MEDICINE

## 2020-08-14 PROCEDURE — 3008F BODY MASS INDEX DOCD: CPT | Performed by: INTERNAL MEDICINE

## 2020-08-14 PROCEDURE — 3079F DIAST BP 80-89 MM HG: CPT | Performed by: INTERNAL MEDICINE

## 2020-08-14 PROCEDURE — 72202 X-RAY EXAM SI JOINTS 3/> VWS: CPT | Performed by: INTERNAL MEDICINE

## 2020-08-14 RX ORDER — DESOGESTREL AND ETHINYL ESTRADIOL 0.15-0.03
1 KIT ORAL DAILY
Qty: 3 PACKAGE | Refills: 4 | OUTPATIENT
Start: 2020-08-14

## 2020-08-14 NOTE — PROGRESS NOTES
Kathleen Carpenter is a 34year old female who presents for Patient presents with:  Consult  Test Results: MAZIN +  Joint Pain: knees  Back Pain: Sciatic pain radiates to left leg  GI Problem  .    HPI:   CC: +MAZIN  Consult: referred by PCP Dr. Rhea Denis (SYMBICORT) 80-4.5 MCG/ACT Inhalation Aerosol Inhale 2 puffs into the lungs 2 (two) times daily. (Patient not taking: Reported on 2/17/2020 ) 1 Inhaler 4   • APRI 0.15-30 MG-MCG Oral Tab Take 1 tablet by mouth daily.  (Patient not taking: Reported on 2/17/2 Psych: + hx of anxiety or depression  ENDO: no hx of thyroid disease, no hx of DM  Joint/Muscluskeltal: see HPI,   All other ROS are negative.      EXAM:   Ht 5' 3\" (1.6 m)   Wt 195 lb (88.5 kg)   LMP 02/03/2020 (Approximate)   BMI 34.54 kg/m²   GEN: AAO CT abd/pelvis 7/2020:  No acute process within the abdomen or pelvis. XR L spine 2017: normal    ASSESSMENT AND PLAN:     +MAZIN  - She was found to have a + MAZIN 1: 320 homogenous pattern, JULIO C panel was negative.   CBC and CMP were normal.  Urinalysis

## 2020-08-14 NOTE — PATIENT INSTRUCTIONS
You were seen today for +MAZIN and joint and back pain  Will get some more blood work and xray of SI joint  Will notify you of results

## 2020-08-17 ENCOUNTER — PATIENT MESSAGE (OUTPATIENT)
Dept: INTERNAL MEDICINE CLINIC | Facility: CLINIC | Age: 30
End: 2020-08-17

## 2020-08-17 RX ORDER — DROSPIRENONE AND ETHINYL ESTRADIOL 0.02-3(28)
1 KIT ORAL DAILY
Qty: 3 PACKAGE | Refills: 1 | Status: SHIPPED | OUTPATIENT
Start: 2020-08-17 | End: 2021-11-20

## 2020-08-17 NOTE — TELEPHONE ENCOUNTER
Fatou Winn, [place] 8/15/2020 8:07 AM CDT      ----- Message -----  From: Imani Louise  Sent: 8/14/2020 5:07 PM CDT  To: [facility] Clinical Staff  Subject: Prescription Question     Hi Dr. Maxwell Hurst,    I was trying to see if I could come in and see you

## 2020-08-18 NOTE — TELEPHONE ENCOUNTER
Kortney Mancini 8/17/2020 8:01 AM CDT      ----- Message -----  From: Elaine Conklin  Sent: 8/17/2020 7:09 AM CDT  To: [facility] Clinical Staff  Subject: Prescription Question     Hi Dr. Armando Sunshine,    I am currently taking Drospirenone/ETHY EST 3/0.02MG for m

## 2020-09-26 ENCOUNTER — OFFICE VISIT (OUTPATIENT)
Dept: INTERNAL MEDICINE CLINIC | Facility: CLINIC | Age: 30
End: 2020-09-26
Payer: MEDICAID

## 2020-09-26 VITALS
HEART RATE: 85 BPM | BODY MASS INDEX: 35.44 KG/M2 | WEIGHT: 200 LBS | DIASTOLIC BLOOD PRESSURE: 70 MMHG | SYSTOLIC BLOOD PRESSURE: 92 MMHG | HEIGHT: 63 IN | OXYGEN SATURATION: 99 % | RESPIRATION RATE: 18 BRPM

## 2020-09-26 DIAGNOSIS — R76.8 ANA POSITIVE: ICD-10-CM

## 2020-09-26 DIAGNOSIS — Z23 IMMUNIZATION DUE: ICD-10-CM

## 2020-09-26 DIAGNOSIS — R79.82 CRP ELEVATED: ICD-10-CM

## 2020-09-26 DIAGNOSIS — Z00.00 ANNUAL PHYSICAL EXAM: Primary | ICD-10-CM

## 2020-09-26 DIAGNOSIS — J45.20 MILD INTERMITTENT ASTHMA WITHOUT COMPLICATION: ICD-10-CM

## 2020-09-26 DIAGNOSIS — Z11.8 ENCOUNTER FOR SCREENING EXAMINATION FOR CHLAMYDIAL INFECTION: ICD-10-CM

## 2020-09-26 PROCEDURE — 99395 PREV VISIT EST AGE 18-39: CPT | Performed by: INTERNAL MEDICINE

## 2020-09-26 PROCEDURE — 3008F BODY MASS INDEX DOCD: CPT | Performed by: INTERNAL MEDICINE

## 2020-09-26 PROCEDURE — 3078F DIAST BP <80 MM HG: CPT | Performed by: INTERNAL MEDICINE

## 2020-09-26 PROCEDURE — 3074F SYST BP LT 130 MM HG: CPT | Performed by: INTERNAL MEDICINE

## 2020-09-26 NOTE — PROGRESS NOTES
Cole Peck is a 34year old female.     Chief complaint: annual physical exam     HPI:   34year old female with PMH as listed below here for   Annual physical exam       Rarely alcohol  Smoking weed   Exercises : 3 times per week       Period is reg Cancer Maternal Grandfather         colon cancer   • Cancer Paternal Grandmother         breast   • Breast Cancer Maternal Aunt      Patient Active Problem List:     Mild intermittent asthma with acute exacerbation      REVIEW OF SYSTEMS:   A comprehensive examination for chlamydial infection  Urine for chalmydia gonorrhea   - PNEUMOCOCCAL IMM (PNEUMOVAX)  - LIPID PANEL; Future  - TSH W REFLEX TO FREE T4; Future  - C-REACTIVE PROTEIN; Future  - CHLAMYDIA/GONOCOCCUS, ROSLYN; Future    6.  Annual physical exam  Ad

## 2020-09-26 NOTE — PROGRESS NOTES
pneumovax injection/vaccine  0.5 mL administered IM to the left deltoid. Vaccine/injection ordered by physician and documented within chart. Per physician able to administer vaccine/injection. Pt tolerated well.  VIS provided and pt had no further questions

## 2021-01-12 DIAGNOSIS — Z00.00 EXAMINATION: Primary | ICD-10-CM

## 2021-01-20 ENCOUNTER — LAB ENCOUNTER (OUTPATIENT)
Dept: LAB | Age: 31
End: 2021-01-20
Attending: INTERNAL MEDICINE
Payer: COMMERCIAL

## 2021-01-20 DIAGNOSIS — Z00.00 ANNUAL PHYSICAL EXAM: ICD-10-CM

## 2021-01-20 DIAGNOSIS — M54.42 CHRONIC BILATERAL LOW BACK PAIN WITH LEFT-SIDED SCIATICA: ICD-10-CM

## 2021-01-20 DIAGNOSIS — J45.20 MILD INTERMITTENT ASTHMA WITHOUT COMPLICATION: ICD-10-CM

## 2021-01-20 DIAGNOSIS — G89.29 CHRONIC BILATERAL LOW BACK PAIN WITH LEFT-SIDED SCIATICA: ICD-10-CM

## 2021-01-20 DIAGNOSIS — R79.82 CRP ELEVATED: ICD-10-CM

## 2021-01-20 DIAGNOSIS — Z11.8 ENCOUNTER FOR SCREENING EXAMINATION FOR CHLAMYDIAL INFECTION: ICD-10-CM

## 2021-01-20 DIAGNOSIS — Z00.00 EXAMINATION: ICD-10-CM

## 2021-01-20 DIAGNOSIS — R76.8 ANA POSITIVE: ICD-10-CM

## 2021-01-20 DIAGNOSIS — M25.50 POLYARTHRALGIA: ICD-10-CM

## 2021-01-20 DIAGNOSIS — Z23 IMMUNIZATION DUE: ICD-10-CM

## 2021-01-20 LAB
ALBUMIN SERPL-MCNC: 3.6 G/DL (ref 3.4–5)
ALBUMIN/GLOB SERPL: 0.8 {RATIO} (ref 1–2)
ALP LIVER SERPL-CCNC: 68 U/L
ALT SERPL-CCNC: 18 U/L
ANION GAP SERPL CALC-SCNC: 5 MMOL/L (ref 0–18)
AST SERPL-CCNC: 10 U/L (ref 15–37)
BASOPHILS # BLD AUTO: 0.09 X10(3) UL (ref 0–0.2)
BASOPHILS NFR BLD AUTO: 1 %
BILIRUB SERPL-MCNC: 0.5 MG/DL (ref 0.1–2)
BUN BLD-MCNC: 11 MG/DL (ref 7–18)
BUN/CREAT SERPL: 10.8 (ref 10–20)
CALCIUM BLD-MCNC: 9.4 MG/DL (ref 8.5–10.1)
CHLORIDE SERPL-SCNC: 107 MMOL/L (ref 98–112)
CHOLEST SMN-MCNC: 187 MG/DL (ref ?–200)
CO2 SERPL-SCNC: 26 MMOL/L (ref 21–32)
CREAT BLD-MCNC: 1.02 MG/DL
CRP SERPL-MCNC: 1.02 MG/DL (ref ?–0.3)
DEPRECATED RDW RBC AUTO: 37.1 FL (ref 35.1–46.3)
EOSINOPHIL # BLD AUTO: 0.24 X10(3) UL (ref 0–0.7)
EOSINOPHIL NFR BLD AUTO: 2.6 %
ERYTHROCYTE [DISTWIDTH] IN BLOOD BY AUTOMATED COUNT: 11.6 % (ref 11–15)
GLOBULIN PLAS-MCNC: 4.4 G/DL (ref 2.8–4.4)
GLUCOSE BLD-MCNC: 82 MG/DL (ref 70–99)
HCT VFR BLD AUTO: 40.6 %
HDLC SERPL-MCNC: 77 MG/DL (ref 40–59)
HGB BLD-MCNC: 13.2 G/DL
IMM GRANULOCYTES # BLD AUTO: 0.02 X10(3) UL (ref 0–1)
IMM GRANULOCYTES NFR BLD: 0.2 %
LDLC SERPL CALC-MCNC: 93 MG/DL (ref ?–100)
LYMPHOCYTES # BLD AUTO: 3.06 X10(3) UL (ref 1–4)
LYMPHOCYTES NFR BLD AUTO: 33.6 %
M PROTEIN MFR SERPL ELPH: 8 G/DL (ref 6.4–8.2)
MCH RBC QN AUTO: 28.9 PG (ref 26–34)
MCHC RBC AUTO-ENTMCNC: 32.5 G/DL (ref 31–37)
MCV RBC AUTO: 88.8 FL
MONOCYTES # BLD AUTO: 0.67 X10(3) UL (ref 0.1–1)
MONOCYTES NFR BLD AUTO: 7.3 %
NEUTROPHILS # BLD AUTO: 5.04 X10 (3) UL (ref 1.5–7.7)
NEUTROPHILS # BLD AUTO: 5.04 X10(3) UL (ref 1.5–7.7)
NEUTROPHILS NFR BLD AUTO: 55.3 %
NONHDLC SERPL-MCNC: 110 MG/DL (ref ?–130)
OSMOLALITY SERPL CALC.SUM OF ELEC: 284 MOSM/KG (ref 275–295)
PATIENT FASTING Y/N/NP: YES
PATIENT FASTING Y/N/NP: YES
PLATELET # BLD AUTO: 356 10(3)UL (ref 150–450)
POTASSIUM SERPL-SCNC: 3.8 MMOL/L (ref 3.5–5.1)
RBC # BLD AUTO: 4.57 X10(6)UL
RHEUMATOID FACT SERPL-ACNC: <10 IU/ML (ref ?–15)
SODIUM SERPL-SCNC: 138 MMOL/L (ref 136–145)
TRIGL SERPL-MCNC: 83 MG/DL (ref 30–149)
TSI SER-ACNC: 1.28 MIU/ML (ref 0.36–3.74)
VLDLC SERPL CALC-MCNC: 17 MG/DL (ref 0–30)
WBC # BLD AUTO: 9.1 X10(3) UL (ref 4–11)

## 2021-01-20 PROCEDURE — 86812 HLA TYPING A B OR C: CPT

## 2021-01-20 PROCEDURE — 87491 CHLMYD TRACH DNA AMP PROBE: CPT

## 2021-01-20 PROCEDURE — 87591 N.GONORRHOEAE DNA AMP PROB: CPT

## 2021-01-20 PROCEDURE — 80061 LIPID PANEL: CPT

## 2021-01-20 PROCEDURE — 86140 C-REACTIVE PROTEIN: CPT

## 2021-01-20 PROCEDURE — 36415 COLL VENOUS BLD VENIPUNCTURE: CPT

## 2021-01-20 PROCEDURE — 84443 ASSAY THYROID STIM HORMONE: CPT

## 2021-01-20 PROCEDURE — 86200 CCP ANTIBODY: CPT | Performed by: INTERNAL MEDICINE

## 2021-01-20 PROCEDURE — 85025 COMPLETE CBC W/AUTO DIFF WBC: CPT

## 2021-01-20 PROCEDURE — 86431 RHEUMATOID FACTOR QUANT: CPT | Performed by: INTERNAL MEDICINE

## 2021-01-20 PROCEDURE — 80053 COMPREHEN METABOLIC PANEL: CPT

## 2021-01-21 LAB
C TRACH DNA SPEC QL NAA+PROBE: NEGATIVE
N GONORRHOEA DNA SPEC QL NAA+PROBE: NEGATIVE

## 2021-01-22 LAB — CCP IGG SERPL-ACNC: 0.7 U/ML (ref 0–6.9)

## 2021-01-23 LAB — HLA-B27: NEGATIVE

## 2021-04-09 DIAGNOSIS — Z23 NEED FOR VACCINATION: ICD-10-CM

## 2021-06-17 ENCOUNTER — HOSPITAL ENCOUNTER (OUTPATIENT)
Age: 31
Discharge: HOME | End: 2021-06-17
Attending: EMERGENCY MEDICINE
Payer: COMMERCIAL

## 2021-06-17 VITALS
HEART RATE: 96 BPM | OXYGEN SATURATION: 98 % | SYSTOLIC BLOOD PRESSURE: 118 MMHG | TEMPERATURE: 98 F | RESPIRATION RATE: 18 BRPM | DIASTOLIC BLOOD PRESSURE: 68 MMHG

## 2021-06-17 DIAGNOSIS — J01.00 ACUTE NON-RECURRENT MAXILLARY SINUSITIS: Primary | ICD-10-CM

## 2021-06-17 PROCEDURE — 99213 OFFICE O/P EST LOW 20 MIN: CPT

## 2021-06-17 RX ORDER — PSEUDOEPHEDRINE HCL 120 MG/1
120 TABLET, FILM COATED, EXTENDED RELEASE ORAL EVERY 12 HOURS PRN
Qty: 10 TABLET | Refills: 0 | Status: SHIPPED | OUTPATIENT
Start: 2021-06-17 | End: 2021-07-17

## 2021-06-17 RX ORDER — FLUTICASONE PROPIONATE 50 MCG
2 SPRAY, SUSPENSION (ML) NASAL DAILY
Qty: 16 G | Refills: 0 | Status: SHIPPED | OUTPATIENT
Start: 2021-06-17 | End: 2021-07-17

## 2021-06-17 NOTE — ED PROVIDER NOTES
Patient Seen in: [facility]      History   Patient presents with:  Nasal Congestion    Stated Complaint: possible sinus infection/cold    HPI/Subjective:   HPI    26-year-old female presents for evaluation for a sinus infection.   Patient stat and nursing note reviewed. Constitutional:       General: She is not in acute distress. Appearance: Normal appearance. She is well-developed. HENT:      Head: Normocephalic and atraumatic. Jaw: No trismus.       Right Ear: Tympanic membrane nor Attention normal.         Mood and Affect: Mood normal.         Speech: Speech normal.         Behavior: Behavior normal.               ED Course     Labs Reviewed   RAPID SARS-COV-2 BY PCR - Normal                   MDM    Patient is non-toxic, well hydra

## 2021-11-20 ENCOUNTER — OFFICE VISIT (OUTPATIENT)
Dept: INTERNAL MEDICINE CLINIC | Facility: CLINIC | Age: 31
End: 2021-11-20
Payer: COMMERCIAL

## 2021-11-20 VITALS
WEIGHT: 200.38 LBS | DIASTOLIC BLOOD PRESSURE: 70 MMHG | HEIGHT: 63 IN | OXYGEN SATURATION: 99 % | HEART RATE: 91 BPM | BODY MASS INDEX: 35.5 KG/M2 | SYSTOLIC BLOOD PRESSURE: 102 MMHG

## 2021-11-20 DIAGNOSIS — Z00.00 ANNUAL PHYSICAL EXAM: Primary | ICD-10-CM

## 2021-11-20 DIAGNOSIS — Z12.4 SCREENING FOR CERVICAL CANCER: ICD-10-CM

## 2021-11-20 PROCEDURE — 3008F BODY MASS INDEX DOCD: CPT | Performed by: INTERNAL MEDICINE

## 2021-11-20 PROCEDURE — 99395 PREV VISIT EST AGE 18-39: CPT | Performed by: INTERNAL MEDICINE

## 2021-11-20 PROCEDURE — 88175 CYTOPATH C/V AUTO FLUID REDO: CPT | Performed by: INTERNAL MEDICINE

## 2021-11-20 PROCEDURE — 3078F DIAST BP <80 MM HG: CPT | Performed by: INTERNAL MEDICINE

## 2021-11-20 PROCEDURE — 3074F SYST BP LT 130 MM HG: CPT | Performed by: INTERNAL MEDICINE

## 2021-11-20 RX ORDER — NEOMYCIN SULFATE, POLYMYXIN B SULFATE, AND DEXAMETHASONE 3.5; 10000; 1 MG/G; [USP'U]/G; MG/G
OINTMENT OPHTHALMIC
COMMUNITY
Start: 2021-07-07

## 2021-11-20 RX ORDER — DROSPIRENONE AND ETHINYL ESTRADIOL 0.02-3(28)
1 KIT ORAL DAILY
Qty: 3 EACH | Refills: 3 | Status: SHIPPED | OUTPATIENT
Start: 2021-11-20

## 2021-11-20 NOTE — PROGRESS NOTES
Masha Bella is a 31year old female.     Chief complaint: annual physical exam     HPI:     Masha Bella is a 31year old pleasant female who presents for annual physical exam        No chest pain no sob no abdominal pain  No diarrhea or constipa Grandfather         colon cancer   • Cancer Paternal Grandmother         breast   • Breast Cancer Maternal Aunt      Patient Active Problem List:     Mild intermittent asthma with acute exacerbation      REVIEW OF SYSTEMS:   A comprehensive 10 point review

## 2021-12-22 ENCOUNTER — TELEPHONE (OUTPATIENT)
Dept: INTERNAL MEDICINE CLINIC | Facility: CLINIC | Age: 31
End: 2021-12-22

## 2021-12-22 ENCOUNTER — PATIENT MESSAGE (OUTPATIENT)
Dept: INTERNAL MEDICINE CLINIC | Facility: CLINIC | Age: 31
End: 2021-12-22

## 2021-12-22 DIAGNOSIS — U07.1 COVID-19 VIRUS INFECTION: Primary | ICD-10-CM

## 2021-12-22 NOTE — TELEPHONE ENCOUNTER
Called the patient tested positive for covid   To check on her and see how she is doing   Also she qualifies for mab based on weight left message on phone to let me know is he si interested

## 2021-12-23 NOTE — TELEPHONE ENCOUNTER
Delilah Lee, [facility] 12/22/2021 2:15 PM CST    Please add order   ----- Message -----  From: Imani Louise  Sent: 12/22/2021 12:13 PM CST  To: [facility] Clinical Staff  Subject: Positive For Katheryn Hurst, I just got your voicemail.  Thank you fo

## 2022-07-08 DIAGNOSIS — R10.9 ABDOMINAL PAIN, UNSPECIFIED ABDOMINAL LOCATION: ICD-10-CM

## 2022-07-08 DIAGNOSIS — R76.8 ANA POSITIVE: ICD-10-CM

## 2022-07-08 DIAGNOSIS — K52.9 COLITIS: ICD-10-CM

## 2022-07-08 DIAGNOSIS — Z87.19 HISTORY OF COLITIS: ICD-10-CM

## 2022-07-08 DIAGNOSIS — R59.0 ABDOMINAL LYMPHADENOPATHY: ICD-10-CM

## 2022-07-10 RX ORDER — ALBUTEROL SULFATE 90 UG/1
2 AEROSOL, METERED RESPIRATORY (INHALATION) EVERY 6 HOURS PRN
Qty: 3 EACH | Refills: 0 | Status: SHIPPED | OUTPATIENT
Start: 2022-07-10 | End: 2022-09-19

## 2022-10-15 DIAGNOSIS — R51.9 WORSENING HEADACHES: ICD-10-CM

## 2022-10-15 DIAGNOSIS — J45.21 MILD INTERMITTENT ASTHMA WITH ACUTE EXACERBATION: ICD-10-CM

## 2022-10-15 DIAGNOSIS — F33.9 EPISODE OF RECURRENT MAJOR DEPRESSIVE DISORDER, UNSPECIFIED DEPRESSION EPISODE SEVERITY (HCC): ICD-10-CM

## 2022-10-15 DIAGNOSIS — Z76.0 MEDICATION REFILL: ICD-10-CM

## 2022-10-17 RX ORDER — DILTIAZEM HYDROCHLORIDE 60 MG/1
TABLET, FILM COATED ORAL
Qty: 10.2 G | Refills: 0 | Status: SHIPPED | OUTPATIENT
Start: 2022-10-17

## 2023-02-22 ENCOUNTER — HOSPITAL ENCOUNTER (OUTPATIENT)
Age: 33
Discharge: HOME | End: 2023-02-22
Payer: COMMERCIAL

## 2023-02-22 VITALS
HEART RATE: 83 BPM | OXYGEN SATURATION: 100 % | SYSTOLIC BLOOD PRESSURE: 123 MMHG | TEMPERATURE: 98 F | RESPIRATION RATE: 18 BRPM | DIASTOLIC BLOOD PRESSURE: 68 MMHG

## 2023-02-22 DIAGNOSIS — J02.9 ACUTE VIRAL PHARYNGITIS: Primary | ICD-10-CM

## 2023-02-22 LAB — S PYO AG THROAT QL: NEGATIVE

## 2023-02-22 PROCEDURE — 99203 OFFICE O/P NEW LOW 30 MIN: CPT | Performed by: NURSE PRACTITIONER

## 2023-02-22 PROCEDURE — 87880 STREP A ASSAY W/OPTIC: CPT | Performed by: NURSE PRACTITIONER

## 2023-02-22 NOTE — ED INITIAL ASSESSMENT (HPI)
Pt in [address] [place] [address] for c/o sore throat upon waking this AM. +congestion. Needs note for work. Had covid in December.

## 2023-08-22 ENCOUNTER — OFFICE VISIT (OUTPATIENT)
Dept: INTERNAL MEDICINE CLINIC | Facility: CLINIC | Age: 33
End: 2023-08-22
Payer: COMMERCIAL

## 2023-08-22 VITALS
HEIGHT: 63 IN | OXYGEN SATURATION: 98 % | SYSTOLIC BLOOD PRESSURE: 110 MMHG | BODY MASS INDEX: 42.7 KG/M2 | DIASTOLIC BLOOD PRESSURE: 84 MMHG | HEART RATE: 86 BPM | WEIGHT: 241 LBS

## 2023-08-22 DIAGNOSIS — Z76.0 MEDICATION REFILL: ICD-10-CM

## 2023-08-22 DIAGNOSIS — F33.9 EPISODE OF RECURRENT MAJOR DEPRESSIVE DISORDER, UNSPECIFIED DEPRESSION EPISODE SEVERITY (HCC): ICD-10-CM

## 2023-08-22 DIAGNOSIS — J45.21 MILD INTERMITTENT ASTHMA WITH ACUTE EXACERBATION: ICD-10-CM

## 2023-08-22 DIAGNOSIS — Z00.00 ANNUAL PHYSICAL EXAM: Primary | ICD-10-CM

## 2023-08-22 DIAGNOSIS — G47.00 INSOMNIA, UNSPECIFIED TYPE: ICD-10-CM

## 2023-08-22 DIAGNOSIS — Z23 NEED FOR VACCINATION: ICD-10-CM

## 2023-08-22 PROCEDURE — 87491 CHLMYD TRACH DNA AMP PROBE: CPT | Performed by: INTERNAL MEDICINE

## 2023-08-22 PROCEDURE — 3079F DIAST BP 80-89 MM HG: CPT | Performed by: INTERNAL MEDICINE

## 2023-08-22 PROCEDURE — 90715 TDAP VACCINE 7 YRS/> IM: CPT | Performed by: INTERNAL MEDICINE

## 2023-08-22 PROCEDURE — 3008F BODY MASS INDEX DOCD: CPT | Performed by: INTERNAL MEDICINE

## 2023-08-22 PROCEDURE — 87591 N.GONORRHOEAE DNA AMP PROB: CPT | Performed by: INTERNAL MEDICINE

## 2023-08-22 PROCEDURE — 99395 PREV VISIT EST AGE 18-39: CPT | Performed by: INTERNAL MEDICINE

## 2023-08-22 PROCEDURE — 3074F SYST BP LT 130 MM HG: CPT | Performed by: INTERNAL MEDICINE

## 2023-08-22 PROCEDURE — 90471 IMMUNIZATION ADMIN: CPT | Performed by: INTERNAL MEDICINE

## 2023-08-22 RX ORDER — TRAZODONE HYDROCHLORIDE 50 MG/1
50 TABLET ORAL NIGHTLY
Qty: 90 TABLET | Refills: 0 | Status: SHIPPED | OUTPATIENT
Start: 2023-08-22 | End: 2023-11-20

## 2023-08-22 RX ORDER — ALBUTEROL SULFATE 90 UG/1
2 AEROSOL, METERED RESPIRATORY (INHALATION) EVERY 6 HOURS PRN
Qty: 18 G | Refills: 3 | Status: SHIPPED | OUTPATIENT
Start: 2023-08-22

## 2023-08-22 RX ORDER — DROSPIRENONE AND ETHINYL ESTRADIOL 0.02-3(28)
1 KIT ORAL DAILY
Qty: 3 EACH | Refills: 3 | Status: SHIPPED | OUTPATIENT
Start: 2023-08-22

## 2023-08-22 RX ORDER — FLUOXETINE 20 MG/1
20 TABLET, FILM COATED ORAL DAILY
Qty: 90 TABLET | Refills: 3 | Status: SHIPPED | OUTPATIENT
Start: 2023-08-22

## 2023-08-23 LAB
C TRACH DNA SPEC QL NAA+PROBE: NEGATIVE
N GONORRHOEA DNA SPEC QL NAA+PROBE: NEGATIVE

## 2024-01-16 ENCOUNTER — APPOINTMENT (OUTPATIENT)
Dept: GENERAL RADIOLOGY | Age: 34
End: 2024-01-16
Attending: PHYSICIAN ASSISTANT
Payer: COMMERCIAL

## 2024-01-16 ENCOUNTER — HOSPITAL ENCOUNTER (OUTPATIENT)
Age: 34
Discharge: HOME | End: 2024-01-16
Payer: COMMERCIAL

## 2024-01-16 VITALS
TEMPERATURE: 99 F | HEART RATE: 109 BPM | RESPIRATION RATE: 20 BRPM | SYSTOLIC BLOOD PRESSURE: 121 MMHG | OXYGEN SATURATION: 98 % | DIASTOLIC BLOOD PRESSURE: 88 MMHG

## 2024-01-16 DIAGNOSIS — J10.1 INFLUENZA B: ICD-10-CM

## 2024-01-16 DIAGNOSIS — J40 BRONCHITIS WITH ACUTE WHEEZING: Primary | ICD-10-CM

## 2024-01-16 LAB
POCT INFLUENZA A: NEGATIVE
POCT INFLUENZA B: POSITIVE
SARS-COV-2 RNA RESP QL NAA+PROBE: NOT DETECTED

## 2024-01-16 PROCEDURE — 99214 OFFICE O/P EST MOD 30 MIN: CPT | Performed by: PHYSICIAN ASSISTANT

## 2024-01-16 PROCEDURE — 71046 X-RAY EXAM CHEST 2 VIEWS: CPT | Performed by: PHYSICIAN ASSISTANT

## 2024-01-16 PROCEDURE — A9150 MISC/EXPER NON-PRESCRIPT DRU: HCPCS | Performed by: PHYSICIAN ASSISTANT

## 2024-01-16 PROCEDURE — U0002 COVID-19 LAB TEST NON-CDC: HCPCS | Performed by: PHYSICIAN ASSISTANT

## 2024-01-16 PROCEDURE — 87502 INFLUENZA DNA AMP PROBE: CPT | Performed by: PHYSICIAN ASSISTANT

## 2024-01-16 RX ORDER — PREDNISONE 20 MG/1
40 TABLET ORAL DAILY
Qty: 10 TABLET | Refills: 0 | Status: SHIPPED | OUTPATIENT
Start: 2024-01-16 | End: 2024-01-21

## 2024-01-16 RX ORDER — BENZONATATE 100 MG/1
100 CAPSULE ORAL 3 TIMES DAILY PRN
Qty: 30 CAPSULE | Refills: 0 | Status: SHIPPED | OUTPATIENT
Start: 2024-01-16

## 2024-01-16 RX ORDER — ACETAMINOPHEN 500 MG
1000 TABLET ORAL ONCE
Status: COMPLETED | OUTPATIENT
Start: 2024-01-16 | End: 2024-01-16

## 2024-01-16 NOTE — ED PROVIDER NOTES
Chief Complaint   Patient presents with    Fever       HPI:     Elizabeth Godoy is a 33 year old female who presents in of congestion cough over the last 2 days with onset of fever overnight, patient took Advil around 4 AM, febrile on arrival, agreeable to Tylenol.  Denies sick contacts or exposures.  Denies recent antibiotics, history of asthma taking Symbicort last night with mild improvement including rescue inhaler.  No nebulizer at home.  Notes previous history of pneumonia, denies headache dizziness ear pain sore throat nasal congestion chest pain active shortness of breath abdominal pain vomiting diarrhea dysuria or rash.      PFSH    PFSH asessment screens reviewed and agree.  Nurses notes reviewed I agree with documentation.    Family History   Problem Relation Age of Onset    Cancer Father         small cell carcinoma    Cancer Maternal Grandfather         colon cancer    Cancer Paternal Grandmother         breast    Breast Cancer Maternal Aunt      Family history reviewed with patient/caregiver and is not pertinent to presenting problem.  Social History     Socioeconomic History    Marital status: Single     Spouse name: Not on file    Number of children: Not on file    Years of education: Not on file    Highest education level: Not on file   Occupational History    Not on file   Tobacco Use    Smoking status: Never    Smokeless tobacco: Never   Vaping Use    Vaping Use: Never used   Substance and Sexual Activity    Alcohol use: Yes     Comment: occasional    Drug use: Yes     Frequency: 7.0 times per week     Types: Cannabis     Comment: marijuana daily    Sexual activity: Not on file   Other Topics Concern    Not on file   Social History Narrative    Not on file     Social Determinants of Health     Financial Resource Strain: Not on file   Food Insecurity: Not on file   Transportation Needs: Not on file   Physical Activity: Not on file   Stress: Not on file   Social Connections: Not on file   Housing  Stability: Not on file         ROS:   Positive for stated complaint: Nasal congestion chest congestion fever.  All other systems reviewed and negative except as noted above.  Constitutional and Vital Signs Reviewed.      Physical Exam:     Findings:    /88   Pulse 109   Temp 98.9 °F (37.2 °C) (Oral)   Resp 20   LMP 12/20/2023   SpO2 98%   GENERAL: well developed, well nourished, well hydrated, no distress  SKIN: good skin turgor, no obvious rashes  NECK: No nuchal rigidity.  Supple, no adenopathy  EXTREMITIES: no cyanosis or edema. AYERS without difficulty  GI: soft, non-tender, normal bowel sounds  HEAD: normocephalic, atraumatic  EYES: sclera non icteric bilateral, conjunctiva clear  EARS: TMs clear bilaterally. Canals clear.  NOSE: rhinorrhea. MMM nasal turbinates: pink, normal mucosa  THROAT: clear, without exudates, uvula midline, and airway patent  LUNGS:expiratory wheeze to upper quadrants.  No retractions.  No rales, rhonchi,  NEURO: No focal deficits  PSYCH: Alert and oriented x3.  Answering questions appropriately.  Mood appropriate.    MDM/Assessment/Plan:   Orders for this encounter:    Orders Placed This Encounter    XR CHEST PA + LAT CHEST (QKV=65714)     Order Specific Question:   What is the Relevant Clinical Indication / Reason for Exam?     Answer:   cough, r/o i nfiltrate     Order Specific Question:   Release to patient     Answer:   Immediate    Rapid SARS-CoV-2 by PCR     Order Specific Question:   Release to patient     Answer:   Immediate    POCT Flu Test     Order Specific Question:   Release to patient     Answer:   Immediate    acetaminophen (Tylenol Extra Strength) tab 1,000 mg    predniSONE 20 MG Oral Tab     Sig: Take 2 tablets (40 mg total) by mouth daily for 5 days.     Dispense:  10 tablet     Refill:  0    benzonatate 100 MG Oral Cap     Sig: Take 1 capsule (100 mg total) by mouth 3 (three) times daily as needed for cough.     Dispense:  30 capsule     Refill:  0       Labs  performed this visit:  Recent Results (from the past 10 hour(s))   Rapid SARS-CoV-2 by PCR    Collection Time: 01/16/24  8:54 AM    Specimen: Nares; Other   Result Value Ref Range    Rapid SARS-CoV-2 by PCR Not Detected Not Detected   POCT Flu Test    Collection Time: 01/16/24  8:54 AM    Specimen: Nares; Other   Result Value Ref Range    POCT INFLUENZA A Negative Negative    POCT INFLUENZA B Positive (A) Negative       MDM:  Patient influenza B positive, chest x-ray without infiltrate.  Patient agrees no Tamiflu based on potential side effects. Steroids provided for support of asthma/bronchitis with cough suppressant with continuatino of fever reducers. Happy with plan of care. Temp/HR improved during encounter.     Diagnosis:    ICD-10-CM    1. Bronchitis with acute wheezing  J40 XR CHEST PA + LAT CHEST (CPT=71046)     XR CHEST PA + LAT CHEST (CPT=71046)      2. Influenza B  J10.1           All results reviewed and discussed with patient.  See AVS for detailed discharge instructions for your condition today.    Follow Up with:  Fredy Scott MD  [address] [place] [id number]  [phone]    Schedule an appointment as soon as possible for a visit in 1 week

## 2024-01-16 NOTE — ED INITIAL ASSESSMENT (HPI)
Advil taken at 0400. Fever (tmax 101), which started yesterday. Cough, and shortness of breath. Hx asthma    Will need a doctors note to work from home.

## 2024-08-26 ENCOUNTER — OFFICE VISIT (OUTPATIENT)
Dept: INTERNAL MEDICINE CLINIC | Facility: CLINIC | Age: 34
End: 2024-08-26
Payer: COMMERCIAL

## 2024-08-26 VITALS
HEART RATE: 79 BPM | BODY MASS INDEX: 39.8 KG/M2 | OXYGEN SATURATION: 97 % | DIASTOLIC BLOOD PRESSURE: 82 MMHG | SYSTOLIC BLOOD PRESSURE: 116 MMHG | HEIGHT: 63 IN | WEIGHT: 224.63 LBS

## 2024-08-26 DIAGNOSIS — Z76.0 MEDICATION REFILL: ICD-10-CM

## 2024-08-26 DIAGNOSIS — F17.200 SMOKING: ICD-10-CM

## 2024-08-26 DIAGNOSIS — R76.8 POSITIVE ANA (ANTINUCLEAR ANTIBODY): ICD-10-CM

## 2024-08-26 DIAGNOSIS — Z23 NEED FOR VACCINATION: ICD-10-CM

## 2024-08-26 DIAGNOSIS — J45.21 MILD INTERMITTENT ASTHMA WITH ACUTE EXACERBATION (HCC): ICD-10-CM

## 2024-08-26 DIAGNOSIS — Z00.00 ANNUAL PHYSICAL EXAM: Primary | ICD-10-CM

## 2024-08-26 DIAGNOSIS — E04.9 ENLARGED THYROID: ICD-10-CM

## 2024-08-26 DIAGNOSIS — F33.9 EPISODE OF RECURRENT MAJOR DEPRESSIVE DISORDER, UNSPECIFIED DEPRESSION EPISODE SEVERITY (HCC): ICD-10-CM

## 2024-08-26 DIAGNOSIS — Z80.9 FAMILY HISTORY OF CANCER: ICD-10-CM

## 2024-08-26 LAB
ALBUMIN SERPL-MCNC: 4.3 G/DL (ref 3.2–4.8)
ALBUMIN/GLOB SERPL: 1.3 {RATIO} (ref 1–2)
ALP LIVER SERPL-CCNC: 74 U/L
ALT SERPL-CCNC: 15 U/L
ANION GAP SERPL CALC-SCNC: 8 MMOL/L (ref 0–18)
AST SERPL-CCNC: 14 U/L (ref ?–34)
BASOPHILS # BLD AUTO: 0.07 X10(3) UL (ref 0–0.2)
BASOPHILS NFR BLD AUTO: 0.8 %
BILIRUB SERPL-MCNC: 0.3 MG/DL (ref 0.3–1.2)
BUN BLD-MCNC: 12 MG/DL (ref 9–23)
BUN/CREAT SERPL: 12.6 (ref 10–20)
CALCIUM BLD-MCNC: 9.6 MG/DL (ref 8.7–10.4)
CHLORIDE SERPL-SCNC: 106 MMOL/L (ref 98–112)
CHOLEST SERPL-MCNC: 198 MG/DL (ref ?–200)
CO2 SERPL-SCNC: 26 MMOL/L (ref 21–32)
CREAT BLD-MCNC: 0.95 MG/DL
DEPRECATED RDW RBC AUTO: 38.7 FL (ref 35.1–46.3)
EGFRCR SERPLBLD CKD-EPI 2021: 82 ML/MIN/1.73M2 (ref 60–?)
EOSINOPHIL # BLD AUTO: 0.2 X10(3) UL (ref 0–0.7)
EOSINOPHIL NFR BLD AUTO: 2.3 %
ERYTHROCYTE [DISTWIDTH] IN BLOOD BY AUTOMATED COUNT: 11.9 % (ref 11–15)
EST. AVERAGE GLUCOSE BLD GHB EST-MCNC: 108 MG/DL (ref 68–126)
FASTING PATIENT LIPID ANSWER: YES
FASTING STATUS PATIENT QL REPORTED: YES
GLOBULIN PLAS-MCNC: 3.3 G/DL (ref 2–3.5)
GLUCOSE BLD-MCNC: 97 MG/DL (ref 70–99)
HBA1C MFR BLD: 5.4 % (ref ?–5.7)
HCT VFR BLD AUTO: 41.4 %
HDLC SERPL-MCNC: 70 MG/DL (ref 40–59)
HGB BLD-MCNC: 13.4 G/DL
IMM GRANULOCYTES # BLD AUTO: 0.03 X10(3) UL (ref 0–1)
IMM GRANULOCYTES NFR BLD: 0.3 %
LDLC SERPL CALC-MCNC: 105 MG/DL (ref ?–100)
LYMPHOCYTES # BLD AUTO: 2.2 X10(3) UL (ref 1–4)
LYMPHOCYTES NFR BLD AUTO: 25 %
MCH RBC QN AUTO: 29.1 PG (ref 26–34)
MCHC RBC AUTO-ENTMCNC: 32.4 G/DL (ref 31–37)
MCV RBC AUTO: 90 FL
MONOCYTES # BLD AUTO: 0.65 X10(3) UL (ref 0.1–1)
MONOCYTES NFR BLD AUTO: 7.4 %
NEUTROPHILS # BLD AUTO: 5.66 X10 (3) UL (ref 1.5–7.7)
NEUTROPHILS # BLD AUTO: 5.66 X10(3) UL (ref 1.5–7.7)
NEUTROPHILS NFR BLD AUTO: 64.2 %
NONHDLC SERPL-MCNC: 128 MG/DL (ref ?–130)
OSMOLALITY SERPL CALC.SUM OF ELEC: 290 MOSM/KG (ref 275–295)
PLATELET # BLD AUTO: 371 10(3)UL (ref 150–450)
POTASSIUM SERPL-SCNC: 4.6 MMOL/L (ref 3.5–5.1)
PROT SERPL-MCNC: 7.6 G/DL (ref 5.7–8.2)
RBC # BLD AUTO: 4.6 X10(6)UL
SODIUM SERPL-SCNC: 140 MMOL/L (ref 136–145)
TRIGL SERPL-MCNC: 132 MG/DL (ref 30–149)
TSI SER-ACNC: 2.35 MIU/ML (ref 0.55–4.78)
VIT D+METAB SERPL-MCNC: 37.8 NG/ML (ref 30–100)
VLDLC SERPL CALC-MCNC: 22 MG/DL (ref 0–30)
WBC # BLD AUTO: 8.8 X10(3) UL (ref 4–11)

## 2024-08-26 PROCEDURE — 83036 HEMOGLOBIN GLYCOSYLATED A1C: CPT | Performed by: INTERNAL MEDICINE

## 2024-08-26 PROCEDURE — 80061 LIPID PANEL: CPT | Performed by: INTERNAL MEDICINE

## 2024-08-26 PROCEDURE — 87591 N.GONORRHOEAE DNA AMP PROB: CPT | Performed by: INTERNAL MEDICINE

## 2024-08-26 PROCEDURE — 86038 ANTINUCLEAR ANTIBODIES: CPT | Performed by: INTERNAL MEDICINE

## 2024-08-26 PROCEDURE — 87491 CHLMYD TRACH DNA AMP PROBE: CPT | Performed by: INTERNAL MEDICINE

## 2024-08-26 PROCEDURE — 86225 DNA ANTIBODY NATIVE: CPT | Performed by: INTERNAL MEDICINE

## 2024-08-26 PROCEDURE — 82306 VITAMIN D 25 HYDROXY: CPT | Performed by: INTERNAL MEDICINE

## 2024-08-26 PROCEDURE — 80050 GENERAL HEALTH PANEL: CPT | Performed by: INTERNAL MEDICINE

## 2024-08-26 RX ORDER — HYDROXYZINE HYDROCHLORIDE 10 MG/1
TABLET, FILM COATED ORAL
COMMUNITY
Start: 2024-03-06

## 2024-08-26 RX ORDER — PROPRANOLOL HYDROCHLORIDE 40 MG/1
40 TABLET ORAL 2 TIMES DAILY PRN
COMMUNITY
Start: 2024-07-26

## 2024-08-26 RX ORDER — PROPRANOLOL HYDROCHLORIDE 20 MG/1
20 TABLET ORAL 3 TIMES DAILY PRN
COMMUNITY
Start: 2024-07-31

## 2024-08-26 RX ORDER — ALBUTEROL SULFATE 90 UG/1
2 AEROSOL, METERED RESPIRATORY (INHALATION) EVERY 6 HOURS PRN
Qty: 18 G | Refills: 3 | Status: SHIPPED | OUTPATIENT
Start: 2024-08-26

## 2024-08-26 RX ORDER — HYDROXYZINE HYDROCHLORIDE 25 MG/1
TABLET, FILM COATED ORAL
COMMUNITY
Start: 2024-06-03

## 2024-08-26 RX ORDER — PROPRANOLOL HYDROCHLORIDE 10 MG/1
10 TABLET ORAL 3 TIMES DAILY
COMMUNITY
Start: 2024-06-03

## 2024-08-26 RX ORDER — DROSPIRENONE AND ETHINYL ESTRADIOL 0.02-3(28)
1 KIT ORAL DAILY
Qty: 3 EACH | Refills: 3 | Status: SHIPPED | OUTPATIENT
Start: 2024-08-26

## 2024-08-26 NOTE — PROGRESS NOTES
Elizabeth Godoy is a 33 year old female.    Chief complaint: annual physical exam       HPI:     Elizabeth Godoy is a 33 year old pleasant female who presents for annual physical exam         She is seeing a psychiatrist   Prozac   Hydroxyzine   Propanolol         Asthma   On albuterol ans symbicort   Asthma is under control       No chest pain no sob no abdominal pain  No diarrhea or constipation   No fever or chills   No urinary complaints              She is taking birth control       Smoking marijuana   Has been smoking since she was 21 YO  Alcohol : once a week   Exercise : 3 times per week   Family history of cancer : as listed           Current Outpatient Medications   Medication Sig Dispense Refill    hydrOXYzine 10 MG Oral Tab       hydrOXYzine 25 MG Oral Tab Take 1 tablet by mouth every evening as needed for anxiety/insomnia      propranolol 40 MG Oral Tab Take 1 tablet (40 mg total) by mouth 2 (two) times daily as needed.      propranolol 20 MG Oral Tab Take 1 tablet (20 mg total) by mouth 3 (three) times daily as needed.      propranolol 10 MG Oral Tab Take 1 tablet (10 mg total) by mouth 3 (three) times daily.      benzonatate 100 MG Oral Cap Take 1 capsule (100 mg total) by mouth 3 (three) times daily as needed for cough. 30 capsule 0    FLUoxetine HCl 20 MG Oral Tab Take 1 tablet (20 mg total) by mouth daily. 90 tablet 3    Drospirenone-Ethinyl Estradiol 3-0.02 MG Oral Tab Take 1 tablet by mouth daily. 3 each 3    albuterol 108 (90 Base) MCG/ACT Inhalation Aero Soln Inhale 2 puffs into the lungs every 6 (six) hours as needed for Wheezing or Shortness of Breath. 18 g 3    SUMAtriptan Succinate (IMITREX) 100 MG Oral Tab Take 1 tablet (100 mg total) by mouth every 2 (two) hours as needed for Migraine. Use at onset; repeat once after 2 HRS-ONLY 2 IN 24 HR MAX 9 tablet 1    Neomycin-Polymyxin-Dexameth 0.1 % Ophthalmic Ointment APPLY One inch RIBBON INT BOTH EYES THREE TIMES A DAY FOR 1 WEEK       Budesonide-Formoterol Fumarate (SYMBICORT) 80-4.5 MCG/ACT Inhalation Aerosol Inhale 2 puffs into the lungs 2 (two) times daily. 1 Inhaler 4    ibuprofen 600 MG Oral Tab Take 1 tablet (600 mg total) by mouth every 6 (six) hours as needed for Pain. To be taken with food 45 tablet 0    SYMBICORT 80-4.5 MCG/ACT Inhalation Aerosol Inhale 2 puffs into the lungs 2 (two) times daily. 10.2 g 0    neomycin-polymyxin-dexamethasone 3.5-99556-2.1 Ophthalmic Ointment       PROAIR  (90 Base) MCG/ACT Inhalation Aero Soln INHALE 2 PUFFS INTO THE LUNGS EVERY 6 HOURS AS NEEDED FOR WHEEZING OR SHORTNESS OF BREATH 8.5 g 1      Past Medical History:    Migraines    No aura     History reviewed. No pertinent surgical history.          Family History   Problem Relation Age of Onset    Cancer Father         small cell carcinoma    Cancer Maternal Grandfather         colon cancer    Cancer Paternal Grandmother         breast    Breast Cancer Maternal Aunt      Patient Active Problem List   Diagnosis    Mild intermittent asthma with acute exacerbation (HCC)    Urinary body odor    Episode of recurrent major depressive disorder (HCC)    Insomnia       REVIEW OF SYSTEMS:   A comprehensive 10 point review of systems was completed.  Pertinent positives and negatives noted in the the HPI            EXAM:   /82   Pulse 79   Ht 5' 3\" (1.6 m)   Wt 224 lb 9.6 oz (101.9 kg)   LMP 08/21/2024   SpO2 97%   BMI 39.79 kg/m²   GENERAL: well developed, well nourished,in no apparent distress  SKIN: no rashes,no suspicious lesions  HEENT: atraumatic, normocephalic,ears and throat are clear  NECK:enlarged thyroid     LUNGS: clear to auscultation  CARDIO: RRR without murmur  GI: no masses, HSM or tenderness  EXTREMITIES: no cyanosis, clubbing or edema  NEURO: no gross deficits              Orders Placed This Encounter    hydrOXYzine 10 MG Oral Tab    hydrOXYzine 25 MG Oral Tab     Sig: Take 1 tablet by mouth every evening as needed for  anxiety/insomnia    propranolol 40 MG Oral Tab     Sig: Take 1 tablet (40 mg total) by mouth 2 (two) times daily as needed.    propranolol 20 MG Oral Tab     Sig: Take 1 tablet (20 mg total) by mouth 3 (three) times daily as needed.    propranolol 10 MG Oral Tab     Sig: Take 1 tablet (10 mg total) by mouth 3 (three) times daily.     No results found.         ASSESSMENT AND PLAN:       ICD-10-CM    1. Annual physical exam  Z00.00 hydrOXYzine 10 MG Oral Tab     hydrOXYzine 25 MG Oral Tab     propranolol 40 MG Oral Tab     propranolol 20 MG Oral Tab     propranolol 10 MG Oral Tab     Genetic Counselor Referral - [place] (Jane Maravilla)     CBC With Differential With Platelet     Comp Metabolic Panel (14)     Lipid Panel     Hemoglobin A1C     TSH W Reflex To Free T4     Vitamin D     Connective Tissue Disease (MAZIN) Screen, Reflex Specific Antibody     US THYROID (CPT=76536)     albuterol 108 (90 Base) MCG/ACT Inhalation Aero Soln     Drospirenone-Ethinyl Estradiol 3-0.02 MG Oral Tab     Chlamydia/GC PCR Combo [E]     CBC With Differential With Platelet     Comp Metabolic Panel (14)     Lipid Panel     Hemoglobin A1C     TSH W Reflex To Free T4     Vitamin D     Connective Tissue Disease (MAZIN) Screen, Reflex Specific Antibody     Chlamydia/GC PCR Combo [E]      2. Family history of cancer  Z80.9 hydrOXYzine 10 MG Oral Tab     hydrOXYzine 25 MG Oral Tab     propranolol 40 MG Oral Tab     propranolol 20 MG Oral Tab     propranolol 10 MG Oral Tab     Genetic Counselor Referral - [place] (Jane Maravilla)     CBC With Differential With Platelet     Comp Metabolic Panel (14)     Lipid Panel     Hemoglobin A1C     TSH W Reflex To Free T4     Vitamin D     Connective Tissue Disease (MAZIN) Screen, Reflex Specific Antibody     US THYROID (CPT=76536)     albuterol 108 (90 Base) MCG/ACT Inhalation Aero Soln     Drospirenone-Ethinyl Estradiol 3-0.02 MG Oral Tab     Chlamydia/GC PCR Combo [E]     CBC With Differential With  Platelet     Comp Metabolic Panel (14)     Lipid Panel     Hemoglobin A1C     TSH W Reflex To Free T4     Vitamin D     Connective Tissue Disease (MAZIN) Screen, Reflex Specific Antibody     Chlamydia/GC PCR Combo [E]      3. Enlarged thyroid  E04.9 hydrOXYzine 10 MG Oral Tab     hydrOXYzine 25 MG Oral Tab     propranolol 40 MG Oral Tab     propranolol 20 MG Oral Tab     propranolol 10 MG Oral Tab     Genetic Counselor Referral - [place] (Jane Maravilla)     CBC With Differential With Platelet     Comp Metabolic Panel (14)     Lipid Panel     Hemoglobin A1C     TSH W Reflex To Free T4     Vitamin D     Connective Tissue Disease (MAZIN) Screen, Reflex Specific Antibody     US THYROID (CPT=76536)     albuterol 108 (90 Base) MCG/ACT Inhalation Aero Soln     Drospirenone-Ethinyl Estradiol 3-0.02 MG Oral Tab     Chlamydia/GC PCR Combo [E]     CBC With Differential With Platelet     Comp Metabolic Panel (14)     Lipid Panel     Hemoglobin A1C     TSH W Reflex To Free T4     Vitamin D     Connective Tissue Disease (MAZIN) Screen, Reflex Specific Antibody     Chlamydia/GC PCR Combo [E]      4. Medication refill  Z76.0 hydrOXYzine 10 MG Oral Tab     hydrOXYzine 25 MG Oral Tab     propranolol 40 MG Oral Tab     propranolol 20 MG Oral Tab     propranolol 10 MG Oral Tab     Genetic Counselor Referral - [place] (Jane Maravilla)     CBC With Differential With Platelet     Comp Metabolic Panel (14)     Lipid Panel     Hemoglobin A1C     TSH W Reflex To Free T4     Vitamin D     Connective Tissue Disease (MAZIN) Screen, Reflex Specific Antibody     US THYROID (CPT=76536)     albuterol 108 (90 Base) MCG/ACT Inhalation Aero Soln     Drospirenone-Ethinyl Estradiol 3-0.02 MG Oral Tab     Chlamydia/GC PCR Combo [E]     CBC With Differential With Platelet     Comp Metabolic Panel (14)     Lipid Panel     Hemoglobin A1C     TSH W Reflex To Free T4     Vitamin D     Connective Tissue Disease (MAZIN) Screen, Reflex Specific Antibody      Chlamydia/GC PCR Combo [E]      5. Mild intermittent asthma with acute exacerbation ([facility])  J45.21 hydrOXYzine 10 MG Oral Tab     hydrOXYzine 25 MG Oral Tab     propranolol 40 MG Oral Tab     propranolol 20 MG Oral Tab     propranolol 10 MG Oral Tab     Genetic Counselor Referral - [place] (Jane Maravilla)     CBC With Differential With Platelet     Comp Metabolic Panel (14)     Lipid Panel     Hemoglobin A1C     TSH W Reflex To Free T4     Vitamin D     Connective Tissue Disease (MAZIN) Screen, Reflex Specific Antibody     US THYROID (CPT=76536)     albuterol 108 (90 Base) MCG/ACT Inhalation Aero Soln     Drospirenone-Ethinyl Estradiol 3-0.02 MG Oral Tab     Chlamydia/GC PCR Combo [E]     CBC With Differential With Platelet     Comp Metabolic Panel (14)     Lipid Panel     Hemoglobin A1C     TSH W Reflex To Free T4     Vitamin D     Connective Tissue Disease (MAZIN) Screen, Reflex Specific Antibody     Chlamydia/GC PCR Combo [E]      6. Episode of recurrent major depressive disorder, unspecified depression episode severity (HCC)  F33.9 hydrOXYzine 10 MG Oral Tab     hydrOXYzine 25 MG Oral Tab     propranolol 40 MG Oral Tab     propranolol 20 MG Oral Tab     propranolol 10 MG Oral Tab     Genetic Counselor Referral - [place] (Jane Maravilla)     CBC With Differential With Platelet     Comp Metabolic Panel (14)     Lipid Panel     Hemoglobin A1C     TSH W Reflex To Free T4     Vitamin D     Connective Tissue Disease (MAZIN) Screen, Reflex Specific Antibody     US THYROID (CPT=76536)     albuterol 108 (90 Base) MCG/ACT Inhalation Aero Soln     Drospirenone-Ethinyl Estradiol 3-0.02 MG Oral Tab     Chlamydia/GC PCR Combo [E]     CBC With Differential With Platelet     Comp Metabolic Panel (14)     Lipid Panel     Hemoglobin A1C     TSH W Reflex To Free T4     Vitamin D     Connective Tissue Disease (MAZIN) Screen, Reflex Specific Antibody     Chlamydia/GC PCR Combo [E]      7. Need for vaccination  Z23 hydrOXYzine 10 MG  Oral Tab     hydrOXYzine 25 MG Oral Tab     propranolol 40 MG Oral Tab     propranolol 20 MG Oral Tab     propranolol 10 MG Oral Tab     Genetic Counselor Referral - [place] (Jane Maravilla)     CBC With Differential With Platelet     Comp Metabolic Panel (14)     Lipid Panel     Hemoglobin A1C     TSH W Reflex To Free T4     Vitamin D     Connective Tissue Disease (MAZIN) Screen, Reflex Specific Antibody     US THYROID (CPT=76536)     albuterol 108 (90 Base) MCG/ACT Inhalation Aero Soln     Drospirenone-Ethinyl Estradiol 3-0.02 MG Oral Tab     Chlamydia/GC PCR Combo [E]     CBC With Differential With Platelet     Comp Metabolic Panel (14)     Lipid Panel     Hemoglobin A1C     TSH W Reflex To Free T4     Vitamin D     Connective Tissue Disease (MAZIN) Screen, Reflex Specific Antibody     Chlamydia/GC PCR Combo [E]      8. Smoking  F17.200 hydrOXYzine 10 MG Oral Tab     hydrOXYzine 25 MG Oral Tab     propranolol 40 MG Oral Tab     propranolol 20 MG Oral Tab     propranolol 10 MG Oral Tab     Genetic Counselor Referral - [place] (Jane Maravilla)     CBC With Differential With Platelet     Comp Metabolic Panel (14)     Lipid Panel     Hemoglobin A1C     TSH W Reflex To Free T4     Vitamin D     Connective Tissue Disease (MAZIN) Screen, Reflex Specific Antibody     US THYROID (CPT=76536)     albuterol 108 (90 Base) MCG/ACT Inhalation Aero Soln     Drospirenone-Ethinyl Estradiol 3-0.02 MG Oral Tab     Chlamydia/GC PCR Combo [E]     CBC With Differential With Platelet     Comp Metabolic Panel (14)     Lipid Panel     Hemoglobin A1C     TSH W Reflex To Free T4     Vitamin D     Connective Tissue Disease (MAZIN) Screen, Reflex Specific Antibody     Chlamydia/GC PCR Combo [E]      9. Positive MAZIN (antinuclear antibody)  R76.8 hydrOXYzine 10 MG Oral Tab     hydrOXYzine 25 MG Oral Tab     propranolol 40 MG Oral Tab     propranolol 20 MG Oral Tab     propranolol 10 MG Oral Tab     Genetic Counselor Referral - [place] (Jane HORNE  Renita)     CBC With Differential With Platelet     Comp Metabolic Panel (14)     Lipid Panel     Hemoglobin A1C     TSH W Reflex To Free T4     Vitamin D     Connective Tissue Disease (MAZIN) Screen, Reflex Specific Antibody     US THYROID (CPT=76536)     albuterol 108 (90 Base) MCG/ACT Inhalation Aero Soln     Drospirenone-Ethinyl Estradiol 3-0.02 MG Oral Tab     Chlamydia/GC PCR Combo [E]     CBC With Differential With Platelet     Comp Metabolic Panel (14)     Lipid Panel     Hemoglobin A1C     TSH W Reflex To Free T4     Vitamin D     Connective Tissue Disease (MAZIN) Screen, Reflex Specific Antibody     Chlamydia/GC PCR Combo [E]         Diet and exercise   Self breast exam   Sun screen recommended   Fasting blood work   Offered pap smear today she doesn't want to do it concerned if insurance will not cover it   GC screening   Blood work   Us thyroid to evaluate enlarged thyroid   Advised to stop smoking discussed risks of smoking   Referral to genetic counselor       Please return to the clinic if you are having persistent symptoms. If worsening symptoms should go to the ER    Fredy Scott MD,   Diplomate of the American Board of Internal Medicine  Diplomate of the American Board of Obesity Medicine

## 2024-08-27 LAB
C TRACH DNA SPEC QL NAA+PROBE: NEGATIVE
DSDNA IGG SERPL IA-ACNC: 2.3 IU/ML
ENA AB SER QL IA: 0.2 UG/L
ENA AB SER QL IA: NEGATIVE
N GONORRHOEA DNA SPEC QL NAA+PROBE: NEGATIVE

## 2024-10-14 ENCOUNTER — PATIENT MESSAGE (OUTPATIENT)
Dept: INTERNAL MEDICINE CLINIC | Facility: CLINIC | Age: 34
End: 2024-10-14

## 2024-11-15 ENCOUNTER — PATIENT MESSAGE (OUTPATIENT)
Dept: INTERNAL MEDICINE CLINIC | Facility: CLINIC | Age: 34
End: 2024-11-15

## 2024-11-15 DIAGNOSIS — D72.829 LEUKOCYTOSIS, UNSPECIFIED TYPE: ICD-10-CM

## 2024-11-15 DIAGNOSIS — R79.89 ABNORMAL THYROID BLOOD TEST: Primary | ICD-10-CM

## 2024-11-20 ENCOUNTER — OFFICE VISIT (OUTPATIENT)
Dept: OBGYN CLINIC | Facility: CLINIC | Age: 34
End: 2024-11-20
Payer: COMMERCIAL

## 2024-11-20 VITALS
DIASTOLIC BLOOD PRESSURE: 77 MMHG | WEIGHT: 228 LBS | BODY MASS INDEX: 40.4 KG/M2 | HEART RATE: 94 BPM | HEIGHT: 63 IN | SYSTOLIC BLOOD PRESSURE: 113 MMHG

## 2024-11-20 DIAGNOSIS — Z01.419 WELL WOMAN EXAM WITH ROUTINE GYNECOLOGICAL EXAM: Primary | ICD-10-CM

## 2024-11-20 DIAGNOSIS — Z76.0 MEDICATION REFILL: ICD-10-CM

## 2024-11-20 DIAGNOSIS — Z00.00 ANNUAL PHYSICAL EXAM: ICD-10-CM

## 2024-11-20 DIAGNOSIS — Z80.9 FAMILY HISTORY OF CANCER: ICD-10-CM

## 2024-11-20 DIAGNOSIS — F17.200 SMOKING: ICD-10-CM

## 2024-11-20 PROCEDURE — 88175 CYTOPATH C/V AUTO FLUID REDO: CPT | Performed by: STUDENT IN AN ORGANIZED HEALTH CARE EDUCATION/TRAINING PROGRAM

## 2024-11-20 PROCEDURE — 87625 HPV TYPES 16 & 18 ONLY: CPT | Performed by: STUDENT IN AN ORGANIZED HEALTH CARE EDUCATION/TRAINING PROGRAM

## 2024-11-20 PROCEDURE — 87624 HPV HI-RISK TYP POOLED RSLT: CPT | Performed by: STUDENT IN AN ORGANIZED HEALTH CARE EDUCATION/TRAINING PROGRAM

## 2024-11-20 RX ORDER — DROSPIRENONE AND ETHINYL ESTRADIOL 0.02-3(28)
1 KIT ORAL DAILY
Qty: 84 EACH | Refills: 4 | Status: SHIPPED | OUTPATIENT
Start: 2024-11-20

## 2024-11-20 RX ORDER — DROSPIRENONE AND ETHINYL ESTRADIOL 0.02-3(28)
1 KIT ORAL DAILY
Qty: 3 EACH | Refills: 3 | Status: CANCELLED | OUTPATIENT
Start: 2024-11-20

## 2024-11-20 NOTE — PROGRESS NOTES
[facility]  Obstetrics and Gynecology  Gynecology Visit    Chief Complaint   Patient presents with    Annual     Last pap  normal            Elizabeth Godoy is a 33 year old female who presents for an annual visit.     Reports no issue(s)/complaint(s) today.     Desires refill of her OCP.     Menstrual/Gyn Hx:   Patient's last menstrual period was 2024.  Regular monthly periods, no bleeding in between, very light, minimal cramping.  No STI hx  No h/o abnormal Pap  Interested in getting HPV vaccine at a later time.   Did not complete Gardasil/HPV vaccination series.   Reports  h/o fibroids or ovarian cysts -> history of follicular cysts. Has not had any type of GYN surgery.   Has  used birth control in the past; currently pills, desires to continue.   Is  currently sexually active, and reports no problems with intercourse --> sometimes bleeds afterwards (small spots), going on as long as been sexually active, unchanged  Reports no issues with bowel or bladder function --  Does not desire fertility in the known future      Focused Fhx:    fhx of cancer: MGPA colon ( at 62), PGMA breast cancer x2 and kidney cancer, 2 maternal aunts with breast [>51 yo at dx]   No h/o VTE in family  No  h/o fragility fracture or hip fracture or osteoporosis in either parent      Patients >40  Does  have PCP - Dr Scott  Reports no h/o HRT -  Has not yet had mammogram, may need earlier screening d/t Fhx       Additional Annual Questions:   1) Colonoscopy due per PCP (48 yo initiation if no Fhx/other indication to start earlier)  2) Bone scan due: 67  3) Diet & Exercise: Losing weight is always great, no specific plans at this time  4) Weight goals: Ideal 170s, going step by step [has workout program]  5) Immunizations:   >Flu (Aug-April) declined  >TDaP  (every 10 years)  >COVID has had 2      Immunization History   Administered Date(s) Administered    Covid-19 Vaccine Pfizer Marky-Sucrose 30 mcg/0.3 ml  01/28/2022, 02/25/2022    DTAP 02/17/1993, 04/14/1993, 06/19/1993, 03/26/1994, 05/08/1998    HEP B, Ped/Adol 12/16/1992, 02/17/1993, 12/18/1993    Hib, Unspecified Formulation 02/17/1993, 06/19/1993    IPV 02/17/1993, 04/14/1993, 03/26/1994, 05/08/1998    MMR 03/26/1994, 05/08/1998    TDAP 08/02/2007, 08/22/2023         Current Outpatient Medications:     FLUoxetine 20 MG Oral Cap, , Disp: , Rfl:     Drospirenone-Ethinyl Estradiol 3-0.02 MG Oral Tab, Take 1 tablet by mouth daily., Disp: 84 each, Rfl: 4    hydrOXYzine 10 MG Oral Tab, , Disp: , Rfl:     hydrOXYzine 25 MG Oral Tab, Take 1 tablet by mouth every evening as needed for anxiety/insomnia, Disp: , Rfl:     propranolol 40 MG Oral Tab, Take 1 tablet (40 mg total) by mouth 2 (two) times daily as needed., Disp: , Rfl:     propranolol 20 MG Oral Tab, Take 1 tablet (20 mg total) by mouth 3 (three) times daily as needed., Disp: , Rfl:     propranolol 10 MG Oral Tab, Take 1 tablet (10 mg total) by mouth 3 (three) times daily., Disp: , Rfl:     albuterol 108 (90 Base) MCG/ACT Inhalation Aero Soln, Inhale 2 puffs into the lungs every 6 (six) hours as needed for Wheezing or Shortness of Breath., Disp: 18 g, Rfl: 3    benzonatate 100 MG Oral Cap, Take 1 capsule (100 mg total) by mouth 3 (three) times daily as needed for cough., Disp: 30 capsule, Rfl: 0    SYMBICORT 80-4.5 MCG/ACT Inhalation Aerosol, Inhale 2 puffs into the lungs 2 (two) times daily., Disp: 10.2 g, Rfl: 0    SUMAtriptan Succinate (IMITREX) 100 MG Oral Tab, Take 1 tablet (100 mg total) by mouth every 2 (two) hours as needed for Migraine. Use at onset; repeat once after 2 HRS-ONLY 2 IN 24 HR MAX, Disp: 9 tablet, Rfl: 1    Neomycin-Polymyxin-Dexameth 0.1 % Ophthalmic Ointment, APPLY One inch RIBBON INT BOTH EYES THREE TIMES A DAY FOR 1 WEEK, Disp: , Rfl:     neomycin-polymyxin-dexamethasone 3.5-51591-3.1 Ophthalmic Ointment, , Disp: , Rfl:     Budesonide-Formoterol Fumarate (SYMBICORT) 80-4.5 MCG/ACT  Inhalation Aerosol, Inhale 2 puffs into the lungs 2 (two) times daily., Disp: 1 Inhaler, Rfl: 4    PROAIR  (90 Base) MCG/ACT Inhalation Aero Soln, INHALE 2 PUFFS INTO THE LUNGS EVERY 6 HOURS AS NEEDED FOR WHEEZING OR SHORTNESS OF BREATH, Disp: 8.5 g, Rfl: 1    ibuprofen 600 MG Oral Tab, Take 1 tablet (600 mg total) by mouth every 6 (six) hours as needed for Pain. To be taken with food, Disp: 45 tablet, Rfl: 0    FLUoxetine HCl 20 MG Oral Tab, Take 1 tablet (20 mg total) by mouth daily. (Patient not taking: Reported on 2024), Disp: 90 tablet, Rfl: 3    Allergies[1]    OB History    Para Term  AB Living   0 0 0 0 0 0   SAB IAB Ectopic Multiple Live Births   0 0 0 0 0       Past Medical History:    Anemia    Anxiety    Depression    Migraines    No aura       Past Surgical History:   Procedure Laterality Date    Other surgical history  2010    Pulinidal Cyst Removal       Family History   Problem Relation Age of Onset    Cancer Father         Small Cell Carcinoma ()    Obesity Father     Cancer Maternal Grandfather         Alzheimer's and Diabetes ()    Diabetes Maternal Grandfather     Cancer Paternal Grandmother         Breast Cancer and Kidney Cancer ()    Breast Cancer Paternal Grandmother     Breast Cancer Maternal Aunt     Cancer Maternal Grandmother         Alzheimers ()    Cancer Paternal Grandfather         Colon Cancer ()    Colon Cancer Paternal Grandfather         Tobacco  Allergies         Social History     Socioeconomic History    Marital status: Single     Spouse name: Not on file    Number of children: Not on file    Years of education: Not on file    Highest education level: Not on file   Occupational History    Not on file   Tobacco Use    Smoking status: Never    Smokeless tobacco: Never   Vaping Use    Vaping status: Never Used   Substance and Sexual Activity    Alcohol use: Yes     Alcohol/week: 4.0 standard drinks of  alcohol     Types: 2 Glasses of wine, 2 Standard drinks or equivalent per week     Comment: occasional    Drug use: Yes     Frequency: 7.0 times per week     Types: Cannabis     Comment: Cannabis for anxiety, stress management, and aids with sleep    Sexual activity: Not on file   Other Topics Concern    Not on file   Social History Narrative    Not on file     Social Drivers of Health     Financial Resource Strain: Not on file   Food Insecurity: Not on file   Transportation Needs: Not on file   Physical Activity: Not on file   Stress: Not on file   Social Connections: Not on file   Housing Stability: Not on file       Physical Exam:    Body mass index is 40.39 kg/m².      Physical Exam  HENT:      Head: Normocephalic and atraumatic.   Eyes:      Extraocular Movements: Extraocular movements intact.   Pulmonary:      Effort: Pulmonary effort is normal.   Abdominal:      Palpations: Abdomen is soft.   Genitourinary:     General: Normal vulva.      Comments: Cervix somewhat friable, suspect ectropion. Otherwise unremarkable SSE, bimanual exam.   Musculoskeletal:      Cervical back: Normal range of motion.   Skin:     General: Skin is warm and dry.   Neurological:      General: No focal deficit present.      Mental Status: She is alert. Mental status is at baseline.   Psychiatric:         Behavior: Behavior normal.         Thought Content: Thought content normal.           Elizabeth Godoy is a 33 year old yo here today for annual visit; no additional concerns/issues today.     Family history of cancer  Referral to high risk breast clinic placed; has significant family cancer history (2 breast cancers in same person who ultimately demised of kidney cancer with additional colon cancer). May change age of initial mammogram for breast cancer screening    Annual physical exam  Pap collected to update. Mammogram as per above. No indication for colonoscopy or DEXA at this age. Birth control refilled. Gardasil at later date.      RTC 1 year or PRN    Kacey Harvey MD         [1]   Allergies  Allergen Reactions    Dust Mite Extract

## 2024-11-21 LAB — HPV E6+E7 MRNA CVX QL NAA+PROBE: POSITIVE

## 2024-11-23 NOTE — ASSESSMENT & PLAN NOTE
Pap collected to update. Mammogram as per above. No indication for colonoscopy or DEXA at this age. Birth control refilled. Gardasil at later date.

## 2024-11-23 NOTE — ASSESSMENT & PLAN NOTE
Referral to high risk breast clinic placed; has significant family cancer history (2 breast cancers in same person who ultimately demised of kidney cancer with additional colon cancer). May change age of initial mammogram for breast cancer screening

## 2024-11-26 ENCOUNTER — HOSPITAL ENCOUNTER (OUTPATIENT)
Dept: ULTRASOUND IMAGING | Age: 34
Discharge: HOME | End: 2024-11-26
Attending: INTERNAL MEDICINE
Payer: COMMERCIAL

## 2024-11-26 DIAGNOSIS — F33.9 EPISODE OF RECURRENT MAJOR DEPRESSIVE DISORDER, UNSPECIFIED DEPRESSION EPISODE SEVERITY (HCC): ICD-10-CM

## 2024-11-26 DIAGNOSIS — R76.8 POSITIVE ANA (ANTINUCLEAR ANTIBODY): ICD-10-CM

## 2024-11-26 DIAGNOSIS — E04.9 ENLARGED THYROID: ICD-10-CM

## 2024-11-26 DIAGNOSIS — J45.21 MILD INTERMITTENT ASTHMA WITH ACUTE EXACERBATION (HCC): ICD-10-CM

## 2024-11-26 DIAGNOSIS — F17.200 SMOKING: ICD-10-CM

## 2024-11-26 DIAGNOSIS — Z23 NEED FOR VACCINATION: ICD-10-CM

## 2024-11-26 DIAGNOSIS — Z76.0 MEDICATION REFILL: ICD-10-CM

## 2024-11-26 DIAGNOSIS — Z00.00 ANNUAL PHYSICAL EXAM: ICD-10-CM

## 2024-11-26 DIAGNOSIS — Z80.9 FAMILY HISTORY OF CANCER: ICD-10-CM

## 2024-11-26 LAB
HPV16 DNA CVX QL PROBE+SIG AMP: POSITIVE
HPV18 DNA CVX QL PROBE+SIG AMP: NEGATIVE

## 2024-11-26 PROCEDURE — 76536 US EXAM OF HEAD AND NECK: CPT | Performed by: INTERNAL MEDICINE

## 2024-12-05 ENCOUNTER — OFFICE VISIT (OUTPATIENT)
Dept: OBGYN CLINIC | Facility: CLINIC | Age: 34
End: 2024-12-05
Payer: COMMERCIAL

## 2024-12-05 VITALS
HEIGHT: 63 IN | HEART RATE: 80 BPM | DIASTOLIC BLOOD PRESSURE: 78 MMHG | SYSTOLIC BLOOD PRESSURE: 116 MMHG | WEIGHT: 226 LBS | BODY MASS INDEX: 40.04 KG/M2

## 2024-12-05 DIAGNOSIS — R87.810 CERVICAL HIGH RISK HUMAN PAPILLOMAVIRUS (HPV) DNA TEST POSITIVE: ICD-10-CM

## 2024-12-05 DIAGNOSIS — Z01.818 PREPROCEDURAL EXAMINATION: Primary | ICD-10-CM

## 2024-12-05 LAB
CONTROL LINE PRESENT WITH A CLEAR BACKGROUND (YES/NO): YES YES/NO
KIT LOT #: NORMAL NUMERIC
PREGNANCY TEST, URINE: NEGATIVE

## 2024-12-05 PROCEDURE — 81025 URINE PREGNANCY TEST: CPT | Performed by: STUDENT IN AN ORGANIZED HEALTH CARE EDUCATION/TRAINING PROGRAM

## 2024-12-05 PROCEDURE — 3008F BODY MASS INDEX DOCD: CPT | Performed by: STUDENT IN AN ORGANIZED HEALTH CARE EDUCATION/TRAINING PROGRAM

## 2024-12-05 PROCEDURE — 3074F SYST BP LT 130 MM HG: CPT | Performed by: STUDENT IN AN ORGANIZED HEALTH CARE EDUCATION/TRAINING PROGRAM

## 2024-12-05 PROCEDURE — 57454 BX/CURETT OF CERVIX W/SCOPE: CPT | Performed by: STUDENT IN AN ORGANIZED HEALTH CARE EDUCATION/TRAINING PROGRAM

## 2024-12-05 PROCEDURE — 3078F DIAST BP <80 MM HG: CPT | Performed by: STUDENT IN AN ORGANIZED HEALTH CARE EDUCATION/TRAINING PROGRAM

## 2024-12-05 NOTE — PROGRESS NOTES
Chief Complaint   Patient presents with    Procedure     Pt here for colposcopy          Elizabeth Godoy is a 33 year old female who presents for colposcopy .        LMP: 2024  Menses regular: Yes  Birth Control: Yes - OCP   Tobacco Use: None - marijuana  STI Hx: None  HPV Vaccination Hx: Did not complete, open    Reports no new symptoms today.     Previous Pap/Colpo Timeline:   2008: NILM  2013: NILM  2018 NILM, no HPV (reflex)  2021 NILM, no HPV (reflex)  2024 NILM, HPV 16+      COLPOSCOPY      Note: Colposcopy procedure:    Timeout performed by confirming name, , and procedure. Positioned in dorsal lithotomy, and cervix visualized with a speculum. Acetic acid and Lugol's iodine was applied to the cervix.     Findings: SCJ was  fully visualized but with significant ectropion and mucus compromising view. Ectropion and areas of aceto-white noted as drawn below. Biopsy(ies)  taken at 6 and 12 o'clock. ECC  performed. Cervix was hemostatic with Monsels and silver nitrate x3.     Physical Exam  Genitourinary:      Genitourinary Comments: Significant ectropion and difficult to visualize the inferior half of the SCJ. Acetowhite globally from 9 to 3 'oclock on the ectocervix.                    Plan F/u results to discuss excision vs repeat testing in 6-12 months after Gardasil

## 2024-12-12 ENCOUNTER — OFFICE VISIT (OUTPATIENT)
Dept: OBGYN CLINIC | Facility: CLINIC | Age: 34
End: 2024-12-12
Payer: COMMERCIAL

## 2024-12-12 ENCOUNTER — TELEPHONE (OUTPATIENT)
Dept: OBGYN CLINIC | Facility: CLINIC | Age: 34
End: 2024-12-12

## 2024-12-12 VITALS
DIASTOLIC BLOOD PRESSURE: 81 MMHG | BODY MASS INDEX: 40 KG/M2 | HEART RATE: 81 BPM | SYSTOLIC BLOOD PRESSURE: 116 MMHG | WEIGHT: 226 LBS

## 2024-12-12 DIAGNOSIS — N76.0 VAGINOSIS: ICD-10-CM

## 2024-12-12 DIAGNOSIS — F41.9 ANXIETY DUE TO INVASIVE PROCEDURE: ICD-10-CM

## 2024-12-12 DIAGNOSIS — R87.810 CERVICAL HIGH RISK HUMAN PAPILLOMAVIRUS (HPV) DNA TEST POSITIVE: ICD-10-CM

## 2024-12-12 DIAGNOSIS — Z23 NEED FOR HPV VACCINE: Primary | ICD-10-CM

## 2024-12-12 PROCEDURE — 99212 OFFICE O/P EST SF 10 MIN: CPT | Performed by: STUDENT IN AN ORGANIZED HEALTH CARE EDUCATION/TRAINING PROGRAM

## 2024-12-12 PROCEDURE — 81025 URINE PREGNANCY TEST: CPT | Performed by: STUDENT IN AN ORGANIZED HEALTH CARE EDUCATION/TRAINING PROGRAM

## 2024-12-12 PROCEDURE — 3079F DIAST BP 80-89 MM HG: CPT | Performed by: STUDENT IN AN ORGANIZED HEALTH CARE EDUCATION/TRAINING PROGRAM

## 2024-12-12 PROCEDURE — 90651 9VHPV VACCINE 2/3 DOSE IM: CPT | Performed by: STUDENT IN AN ORGANIZED HEALTH CARE EDUCATION/TRAINING PROGRAM

## 2024-12-12 PROCEDURE — 3074F SYST BP LT 130 MM HG: CPT | Performed by: STUDENT IN AN ORGANIZED HEALTH CARE EDUCATION/TRAINING PROGRAM

## 2024-12-12 PROCEDURE — 90471 IMMUNIZATION ADMIN: CPT | Performed by: STUDENT IN AN ORGANIZED HEALTH CARE EDUCATION/TRAINING PROGRAM

## 2024-12-12 NOTE — PATIENT INSTRUCTIONS
Mushroom Source:  [email or web address]://www.cc.net/id-tested/    1) Take 3g of AHCC supplement every morning before breakfast for 6 months  2) Complete Gardasil over the next 6 mos

## 2024-12-12 NOTE — PROGRESS NOTES
[facility]  Obstetrics and Gynecology  Gynecology Established Problem Exam    Chief Complaint   Patient presents with    Follow - Up     Pt here to follow up on results from colposcopy.       Elizabeth Godoy is a 34 year old female presenting for Follow - Up (Pt here to follow up on results from colposcopy.)   .     Desires excision; would like to explore coverage options for excisional procedure at this time. If not well covered by insurance, then will plan for Pap repeat after 6 months of [facility] mushroom clearance and administration of Gardasil.     Has ulcerative colitis --> no meds, has never had colonoscopy.           Medications (Active prior to today's visit):  Current Outpatient Medications   Medication Sig Dispense Refill    FLUoxetine 20 MG Oral Cap       Drospirenone-Ethinyl Estradiol 3-0.02 MG Oral Tab Take 1 tablet by mouth daily. 84 each 4    hydrOXYzine 10 MG Oral Tab       hydrOXYzine 25 MG Oral Tab Take 1 tablet by mouth every evening as needed for anxiety/insomnia      propranolol 40 MG Oral Tab Take 1 tablet (40 mg total) by mouth 2 (two) times daily as needed.      propranolol 20 MG Oral Tab Take 1 tablet (20 mg total) by mouth 3 (three) times daily as needed.      propranolol 10 MG Oral Tab Take 1 tablet (10 mg total) by mouth 3 (three) times daily.      albuterol 108 (90 Base) MCG/ACT Inhalation Aero Soln Inhale 2 puffs into the lungs every 6 (six) hours as needed for Wheezing or Shortness of Breath. 18 g 3    benzonatate 100 MG Oral Cap Take 1 capsule (100 mg total) by mouth 3 (three) times daily as needed for cough. 30 capsule 0    FLUoxetine HCl 20 MG Oral Tab Take 1 tablet (20 mg total) by mouth daily. 90 tablet 3    SYMBICORT 80-4.5 MCG/ACT Inhalation Aerosol Inhale 2 puffs into the lungs 2 (two) times daily. 10.2 g 0    SUMAtriptan Succinate (IMITREX) 100 MG Oral Tab Take 1 tablet (100 mg total) by mouth every 2 (two) hours as needed for Migraine. Use at onset; repeat once after 2 HRS-ONLY 2 IN  24 HR MAX 9 tablet 1    Neomycin-Polymyxin-Dexameth 0.1 % Ophthalmic Ointment APPLY One inch RIBBON INT BOTH EYES THREE TIMES A DAY FOR 1 WEEK      neomycin-polymyxin-dexamethasone 3.5-93535-8.1 Ophthalmic Ointment       Budesonide-Formoterol Fumarate (SYMBICORT) 80-4.5 MCG/ACT Inhalation Aerosol Inhale 2 puffs into the lungs 2 (two) times daily. 1 Inhaler 4    PROAIR  (90 Base) MCG/ACT Inhalation Aero Soln INHALE 2 PUFFS INTO THE LUNGS EVERY 6 HOURS AS NEEDED FOR WHEEZING OR SHORTNESS OF BREATH 8.5 g 1    ibuprofen 600 MG Oral Tab Take 1 tablet (600 mg total) by mouth every 6 (six) hours as needed for Pain. To be taken with food 45 tablet 0     Allergies:  Allergies[1]  HISTORY:     OB History    Para Term  AB Living   0 0 0 0 0 0   SAB IAB Ectopic Multiple Live Births   0 0 0 0 0                 Past Medical History:    Anemia    Anxiety    Depression    Migraines    No aura       Past Surgical History:   Procedure Laterality Date    Other surgical history  2010    Pulinidal Cyst Removal       Family History   Problem Relation Age of Onset    Cancer Father         Small Cell Carcinoma ()    Obesity Father     Cancer Maternal Grandfather         Alzheimer's and Diabetes ()    Diabetes Maternal Grandfather     Cancer Paternal Grandmother         Breast Cancer and Kidney Cancer ()    Breast Cancer Paternal Grandmother     Breast Cancer Maternal Aunt     Cancer Maternal Grandmother         Alzheimers ()    Cancer Paternal Grandfather         Colon Cancer ()    Colon Cancer Paternal Grandfather        Social History     Socioeconomic History    Marital status: Single     Spouse name: Not on file    Number of children: Not on file    Years of education: Not on file    Highest education level: Not on file   Occupational History    Not on file   Tobacco Use    Smoking status: Never    Smokeless tobacco: Never   Vaping Use    Vaping status: Never Used    Substance and Sexual Activity    Alcohol use: Yes     Alcohol/week: 4.0 standard drinks of alcohol     Types: 2 Glasses of wine, 2 Standard drinks or equivalent per week     Comment: occasional    Drug use: Yes     Frequency: 7.0 times per week     Types: Cannabis     Comment: Cannabis for anxiety, stress management, and aids with sleep    Sexual activity: Not on file   Other Topics Concern    Not on file   Social History Narrative    Not on file     Social Drivers of Health     Financial Resource Strain: Not on file   Food Insecurity: Not on file   Transportation Needs: Not on file   Physical Activity: Not on file   Stress: Not on file   Social Connections: Not on file   Housing Stability: Not on file       ROS:   Review of Systems:    General: no fevers, chills, unintended weight loss/gain except as above  Cardiovascular: no chest pain, new or unexplained SOB except as above  Gastrointestinal: no nausea/vomiting, diarrhea, or blood in stool except as above  Respiratory: no new symptoms reported except as above  Skin: no new symptoms reported except as above  Psychiatric: no new symptoms reported except as above  PHYSICAL EXAM:   /81   Pulse 81   Wt 226 lb (102.5 kg)   LMP 12/09/2024   Breastfeeding No   BMI 40.03 kg/m²     Physical Exam  HENT:      Head: Normocephalic and atraumatic.   Eyes:      Extraocular Movements: Extraocular movements intact.   Pulmonary:      Effort: Pulmonary effort is normal.   Abdominal:      Palpations: Abdomen is soft.   Musculoskeletal:      Cervical back: Normal range of motion.   Skin:     General: Skin is warm and dry.   Neurological:      General: No focal deficit present.      Mental Status: She is alert. Mental status is at baseline.   Psychiatric:         Behavior: Behavior normal.         Thought Content: Thought content normal.              RESULTS & IMAGING         No results for input(s): \"URINEPREG\" in the last 72 hours.    No results for input(s): \"PGLU\",  \"POCTGLUCOSE\" in the last 72 hours.    No results for input(s): \"GLUCOSEDIP\", \"BILIRUBIN\", \"KETONESDIP\", \"BLOODU\", \"PHURINE\", \"UROBILIN\", \"NITRITE\", \"LEUKOCYTES\", \"APPEARANCE\", \"URINECOLOR\" in the last 72 hours.    Invalid input(s): \"SPECGRAV\"      Colpo: Normal cels with acute and chronic inflammation     ASSESSMENT & PLAN     Need for HPV vaccine (Primary)  -     Urine Pregnancy Test  Vaginosis  -     Vaginitis Vaginosis PCR Panel; Future; Expected date: 12/12/2024  -     Vaginitis Vaginosis PCR Panel  Anxiety due to invasive procedure  -     LORazepam; Take 1 tablet (1 mg total) by mouth 1 (one) time if needed for Anxiety. Take one tablet one hour prior to procedure time. DO NOT take until after you have signed the consent form for your procedure.  Dispense: 1 tablet; Refill: 0  Cervical high risk human papillomavirus (HPV) DNA test positive  Assessment & Plan:  Desires excisional procedure given HPV 16+ and some amount of immune dysregulation with autoimmune disease (UC). Will plan for excisional procedure for more extensive sampling to r/o underlying further dysplasia, as well as improve chances of HPV 16 elimintation. Additionally vaginits collected as persistent inflammation d/t BV or yeast can worsen their chance of clearing HPV spontaneously.   Other orders  -     GARDASIL 9          Kacey Harvey MD  12/12/2024  2:07 PM               [1]   Allergies  Allergen Reactions    Dust Mite Extract

## 2024-12-13 LAB
BV BACTERIA DNA VAG QL NAA+PROBE: NEGATIVE
C GLABRATA DNA VAG QL NAA+PROBE: NEGATIVE
C KRUSEI DNA VAG QL NAA+PROBE: NEGATIVE
CANDIDA DNA VAG QL NAA+PROBE: POSITIVE
T VAGINALIS DNA VAG QL NAA+PROBE: NEGATIVE

## 2024-12-17 ENCOUNTER — TELEPHONE (OUTPATIENT)
Dept: OBGYN CLINIC | Facility: CLINIC | Age: 34
End: 2024-12-17

## 2024-12-17 NOTE — TELEPHONE ENCOUNTER
Patient called to schedule Leep to be done in 2024. Dr. Harvey mentioned to schedule 12/22. No appointments available. Please call.

## 2024-12-19 RX ORDER — LORAZEPAM 1 MG/1
1 TABLET ORAL
Qty: 1 TABLET | Refills: 0 | Status: SHIPPED | OUTPATIENT
Start: 2024-12-19 | End: 2024-12-19

## 2024-12-20 ENCOUNTER — OFFICE VISIT (OUTPATIENT)
Dept: OBGYN CLINIC | Facility: CLINIC | Age: 34
End: 2024-12-20
Payer: COMMERCIAL

## 2024-12-20 VITALS
BODY MASS INDEX: 40.04 KG/M2 | DIASTOLIC BLOOD PRESSURE: 75 MMHG | WEIGHT: 226 LBS | HEART RATE: 137 BPM | SYSTOLIC BLOOD PRESSURE: 109 MMHG | HEIGHT: 63 IN

## 2024-12-20 DIAGNOSIS — Z32.00 PREGNANCY EXAMINATION OR TEST, PREGNANCY UNCONFIRMED: Primary | ICD-10-CM

## 2024-12-20 DIAGNOSIS — Z98.890 S/P LEEP (LOOP ELECTROSURGICAL EXCISION PROCEDURE): ICD-10-CM

## 2024-12-20 DIAGNOSIS — R87.810 CERVICAL HIGH RISK HUMAN PAPILLOMAVIRUS (HPV) DNA TEST POSITIVE: ICD-10-CM

## 2024-12-20 LAB
CONTROL LINE PRESENT WITH A CLEAR BACKGROUND (YES/NO): YES YES/NO
PREGNANCY TEST, URINE: NEGATIVE

## 2024-12-20 PROCEDURE — 88307 TISSUE EXAM BY PATHOLOGIST: CPT | Performed by: STUDENT IN AN ORGANIZED HEALTH CARE EDUCATION/TRAINING PROGRAM

## 2024-12-20 PROCEDURE — 88305 TISSUE EXAM BY PATHOLOGIST: CPT | Performed by: STUDENT IN AN ORGANIZED HEALTH CARE EDUCATION/TRAINING PROGRAM

## 2024-12-20 RX ORDER — KETOROLAC TROMETHAMINE 30 MG/ML
30 INJECTION, SOLUTION INTRAMUSCULAR; INTRAVENOUS ONCE
Status: CANCELLED | OUTPATIENT
Start: 2024-12-20 | End: 2024-12-20

## 2024-12-20 RX ORDER — KETOROLAC TROMETHAMINE 30 MG/ML
30 INJECTION, SOLUTION INTRAMUSCULAR; INTRAVENOUS ONCE
Status: COMPLETED | OUTPATIENT
Start: 2024-12-20 | End: 2024-12-20

## 2024-12-20 RX ADMIN — KETOROLAC TROMETHAMINE 30 MG: 30 INJECTION, SOLUTION INTRAMUSCULAR; INTRAVENOUS at 16:02:00

## 2024-12-20 NOTE — PROCEDURES
In Office LEEP Procedure:    Pre-Procedure Diagnosis: HPV 16+   Post-Procedure Diagnosis: Same    Findings: Nulliparous cervix with friable areas, presumed ectropion. Friability created increased difficulty in assessing true acetowhite changes, similar to colposcopy. No discrete masses.     EBL: <10 mL    Procedure  R/B/A of the procedure were previously discussed in the office, and informed consent obtained with written consent signed on arrival. After signing the procedural consent, the patient took the PRN lorazepam for anxiolysis. Once in the procedure room the patient name, , and procedure were confirmed.    A coated speculum was placed in the vagina, and the cervix was visualized.  Acetic acid was then applied to the cervix.  A coated tenaculum was used to grasp the cervix, and a cervical block and paracervical block using 1% Lidocaine with epinephrine was performed without difficulty.  Difficult to identify acetowhite areas due to friability.  The LEEP machine was set to 35/40 for cut and coag respectively.  Using the 15 x 7 mm loop electrode, a cervical conization was performed with 2 passes.  ECC was then collected. The ball cautery was then used in the conization bed for hemostasis and Monsel's was also applied. Tenaculum was removed, and small bleeding on a tenaculum site was noted which was addressed with Monsels and silver nitrate, and subsequently hemostatic. Speculum removed.  The patient tolerated the procedure well.    Kacey Harvey MD

## 2025-01-03 NOTE — ASSESSMENT & PLAN NOTE
Desires excisional procedure given HPV 16+ and some amount of immune dysregulation with autoimmune disease (UC). Will plan for excisional procedure for more extensive sampling to r/o underlying further dysplasia, as well as improve chances of HPV 16 elimintation. Additionally vaginits collected as persistent inflammation d/t BV or yeast can worsen their chance of clearing HPV spontaneously.

## 2025-01-13 ENCOUNTER — TELEPHONE (OUTPATIENT)
Dept: OBGYN CLINIC | Facility: CLINIC | Age: 35
End: 2025-01-13

## 2025-01-13 ENCOUNTER — OFFICE VISIT (OUTPATIENT)
Dept: OBGYN CLINIC | Facility: CLINIC | Age: 35
End: 2025-01-13

## 2025-01-13 VITALS
SYSTOLIC BLOOD PRESSURE: 104 MMHG | WEIGHT: 225 LBS | HEIGHT: 63 IN | DIASTOLIC BLOOD PRESSURE: 74 MMHG | BODY MASS INDEX: 39.87 KG/M2

## 2025-01-13 DIAGNOSIS — D06.9 HIGH GRADE SQUAMOUS INTRAEPITHELIAL LESION (HGSIL), GRADE 3 CIN, ON BIOPSY OF CERVIX: Primary | ICD-10-CM

## 2025-01-13 DIAGNOSIS — D06.9 CIN III (CERVICAL INTRAEPITHELIAL NEOPLASIA III): Primary | ICD-10-CM

## 2025-01-13 PROCEDURE — 3078F DIAST BP <80 MM HG: CPT | Performed by: STUDENT IN AN ORGANIZED HEALTH CARE EDUCATION/TRAINING PROGRAM

## 2025-01-13 PROCEDURE — 3074F SYST BP LT 130 MM HG: CPT | Performed by: STUDENT IN AN ORGANIZED HEALTH CARE EDUCATION/TRAINING PROGRAM

## 2025-01-13 PROCEDURE — 3008F BODY MASS INDEX DOCD: CPT | Performed by: STUDENT IN AN ORGANIZED HEALTH CARE EDUCATION/TRAINING PROGRAM

## 2025-01-13 PROCEDURE — 99212 OFFICE O/P EST SF 10 MIN: CPT | Performed by: STUDENT IN AN ORGANIZED HEALTH CARE EDUCATION/TRAINING PROGRAM

## 2025-01-13 NOTE — TELEPHONE ENCOUNTER
----- Message from Kacey Stapletontit sent at 1/13/2025 11:43 AM CST -----  Regarding: OR LEEP Case Request  Please schedule the following surgery:     Procedure: LEEP   Assist: (Y/N or none): No  Date: 1/17/2025  Dx: ABNER 3, concern for skip lesion  Pre-op appt: (Y/N or n/a) NA  Admission type: (IN/OUT/OBVS) OUT  Department of discharge(SDS/Floor): SDS  Expected length of stay: <1d  Procedure length time (please enter amount you are requesting): 45-60 mins  Recovery time (patients always ask): 2-4 weeks  Medical Clearance: (Y/N) No --> requesting MAC anesthesia + cervical block vs LMA pending anesthesia evaluation

## 2025-01-13 NOTE — TELEPHONE ENCOUNTER
I spoke with the patient, confirmed date and time for her procedure. I also sent a minor surgical case letter via [employer]    Surgical case request has been sent     Per availity, prior authorization is not needed for this procedure. Reference number [id number]

## 2025-01-13 NOTE — PATIENT INSTRUCTIONS
[facility] Sources:   [email or web address]    Take 3g (number of tablets vary based on specific source) first thing in the morning every morning for 6 months.

## 2025-02-04 NOTE — DISCHARGE INSTRUCTIONS
After the procedure  You may have a watery pink discharge and mild cramping following the procedure. Also, the solution used to decrease bleeding may cause dark vaginal discharge for a few days. Don't place anything in your vagina or have sex until your healthcare provider tells you it's OK. Your cervix should heal completely within a few weeks.   When to call your healthcare provider  Call your healthcare provider right away if you have any of the following:   Heavy bleeding or bleeding with clots  Severe belly pain  Fever    Home care  Take it easy. Rest for 2 days as needed.  Go back to your normal activities after 24 to 48 hours. You may also return to work at that time.  Eat a normal diet.  Take an over-the-counter pain reliever if needed.  Don’t drive for 24 hours after unless your provider says it's OK.  Don’t have sex or use tampons or douches until your healthcare provider says it’s safe.    Follow-up  Make a follow-up appointment in 2 weeks        HOME INSTRUCTIONS  [facility] HOME CARE INSTRUCTIONS: POST-OP ANESTHESIA  The medication that you received for sedation or general anesthesia can last up to 24 hours. Your judgment and reflexes may be altered, even if you feel like your normal self.      We Recommend:   Do not drive any motor vehicle or bicycle   Avoid mowing the lawn, playing sports, or working with power tools/applicances (power saws, electric knives or mixers)   That you have someone stay with you on your first night home   Do not drink alcohol or take sleeping pills or tranquilizers   Do not sign legal documents within 24 hours of your procedure   If you had a nerve block for your surgery, take extra care not to put any pressure on your arm or hand for 24 hours    It is normal:  For you to have a sore throat if you had a breathing tube during surgery (while you were asleep!). The sore throat should get better within 48 hours. You can gargle with warm salt water (1/2 tsp in 4 oz warm water) or  use a throat lozenge for comfort  To feel muscle aches or soreness especially in the abdomen, chest or neck. The achy feeling should go away in the next 24 hours  To feel weak, sleepy or \"wiped out\". Your should start feeling better in the next 24 hours.   To experience mild discomforts such as sore lip or tongue, headache, cramps, gas pains or a bloated feeling in your abdomen.   To experience mild back pain or soreness for a day or two if you had spinal or epidural anesthesia.   If you had laparoscopic surgery, to feel shoulder pain or discomfort on the day of surgery.   For some patients to have nausea after surgery/anesthesia    If you feel nausea or experience vomiting:   Try to move around less.   Eat less than usual or drink only liquids until the next morning   Nausea should resolve in about 24 hours    If you have a problem when you are at home:    Call your surgeons office   Discharge Instructions: After Your Surgery  You’ve just had surgery. During surgery, you were given medicine called anesthesia to keep you relaxed and free of pain. After surgery, you may have some pain or nausea. This is common. Here are some tips for feeling better and getting well after surgery.   Going home  Your healthcare provider will show you how to take care of yourself when you go home. They'll also answer your questions. Have an adult family member or friend drive you home. For the first 24 hours after your surgery:   Don't drive or use heavy equipment.  Don't make important decisions or sign legal papers.  Take medicines as directed.  Don't drink alcohol.  Have someone stay with you, if needed. They can watch for problems and help keep you safe.  Be sure to go to all follow-up visits with your healthcare provider. And rest after your surgery for as long as your provider tells you to.   Coping with pain  If you have pain after surgery, pain medicine will help you feel better. Take it as directed, before pain becomes severe.  Also, ask your healthcare provider or pharmacist about other ways to control pain. This might be with heat, ice, or relaxation. And follow any other instructions your surgeon or nurse gives you.      Stay on schedule with your medicine.     Tips for taking pain medicine  To get the best relief possible, remember these points:   Pain medicines can upset your stomach. Taking them with a little food may help.  Most pain relievers taken by mouth need at least 20 to 30 minutes to start to work.  Don't wait till your pain becomes severe before you take your medicine. Try to time your medicine so that you can take it before starting an activity. This might be before you get dressed, go for a walk, or sit down for dinner.  Constipation is a common side effect of some pain medicines. Call your healthcare provider before taking any medicines such as laxatives or stool softeners to help ease constipation. Also ask if you should skip any foods. Drinking lots of fluids and eating foods such as fruits and vegetables that are high in fiber can also help. Remember, don't take laxatives unless your surgeon has prescribed them.  Drinking alcohol and taking pain medicine can cause dizziness and slow your breathing. It can even be deadly. Don't drink alcohol while taking pain medicine.  Pain medicine can make you react more slowly to things. Don't drive or run machinery while taking pain medicine.  Your healthcare provider may tell you to take acetaminophen to help ease your pain. Ask them how much you're supposed to take each day. Acetaminophen or other pain relievers may interact with your prescription medicines or other over-the-counter (OTC) medicines. Some prescription medicines have acetaminophen and other ingredients in them. Using both prescription and OTC acetaminophen for pain can cause you to accidentally overdose. Read the labels on your OTC medicines with care. This will help you to clearly know the list of ingredients,  how much to take, and any warnings. It may also help you not take too much acetaminophen. If you have questions or don't understand the information, ask your pharmacist or healthcare provider to explain it to you before you take the OTC medicine.   Managing nausea  Some people have an upset stomach (nausea) after surgery. This is often because of anesthesia, pain, or pain medicine, less movement of food in the stomach, or the stress of surgery. These tips will help you handle nausea and eat healthy foods as you get better. If you were on a special food plan before surgery, ask your healthcare provider if you should follow it while you get better. Check with your provider on how your eating should progress. It may depend on the surgery you had. These general tips may help:   Don't push yourself to eat. Your body will tell you when to eat and how much.  Start off with clear liquids and soup. They're easier to digest.  Next try semi-solid foods as you feel ready. These include mashed potatoes, applesauce, and gelatin.  Slowly move to solid foods. Don’t eat fatty, rich, or spicy foods at first.  Don't force yourself to have 3 large meals a day. Instead eat smaller amounts more often.  Take pain medicines with a small amount of solid food, such as crackers or toast. This helps prevent nausea.  When to call your healthcare provider  Call your healthcare provider right away if you have any of these:   You still have too much pain, or the pain gets worse, after taking the medicine. The medicine may not be strong enough. Or there may be a complication from the surgery.  You feel too sleepy, dizzy, or groggy. The medicine may be too strong.  Side effects such as nausea or vomiting. Your healthcare provider may advise taking other medicines to .  Skin changes such as rash, itching, or hives. This may mean you have an allergic reaction. Your provider may advise taking other medicines.  The incision looks different (for  instance, part of it opens up).  Bleeding or fluid leaking from the incision site, and weren't told to expect that.  Fever of 100.4°F (38°C) or higher, or as directed by your provider.  Call 911  Call 911 right away if you have:   Trouble breathing  Facial swelling    If you have obstructive sleep apnea   You were given anesthesia medicine during surgery to keep you comfortable and free of pain. After surgery, you may have more apnea spells because of this medicine and other medicines you were given. The spells may last longer than normal.    At home:  Keep using the continuous positive airway pressure (CPAP) device when you sleep. Unless your healthcare provider tells you not to, use it when you sleep, day or night. CPAP is a common device used to treat obstructive sleep apnea.  Talk with your provider before taking any pain medicine, muscle relaxants, or sedatives. Your provider will tell you about the possible dangers of taking these medicines.  Contact your provider if your sleeping changes a lot even when taking medicines as directed.

## 2025-02-07 ENCOUNTER — ANESTHESIA (OUTPATIENT)
Dept: SURGERY | Facility: HOSPITAL | Age: 35
End: 2025-02-07
Payer: COMMERCIAL

## 2025-02-07 ENCOUNTER — ANESTHESIA EVENT (OUTPATIENT)
Dept: SURGERY | Facility: HOSPITAL | Age: 35
End: 2025-02-07
Payer: COMMERCIAL

## 2025-02-07 ENCOUNTER — HOSPITAL ENCOUNTER (OUTPATIENT)
Facility: HOSPITAL | Age: 35
Setting detail: HOSPITAL OUTPATIENT SURGERY
Discharge: HOME | End: 2025-02-07
Attending: STUDENT IN AN ORGANIZED HEALTH CARE EDUCATION/TRAINING PROGRAM | Admitting: STUDENT IN AN ORGANIZED HEALTH CARE EDUCATION/TRAINING PROGRAM
Payer: COMMERCIAL

## 2025-02-07 VITALS
SYSTOLIC BLOOD PRESSURE: 108 MMHG | BODY MASS INDEX: 39.69 KG/M2 | HEART RATE: 59 BPM | WEIGHT: 224 LBS | RESPIRATION RATE: 20 BRPM | TEMPERATURE: 99 F | DIASTOLIC BLOOD PRESSURE: 63 MMHG | HEIGHT: 63 IN | OXYGEN SATURATION: 99 %

## 2025-02-07 DIAGNOSIS — D06.9 CIN III (CERVICAL INTRAEPITHELIAL NEOPLASIA III): ICD-10-CM

## 2025-02-07 LAB — B-HCG UR QL: NEGATIVE

## 2025-02-07 PROCEDURE — 57461 CONZ OF CERVIX W/SCOPE LEEP: CPT | Performed by: STUDENT IN AN ORGANIZED HEALTH CARE EDUCATION/TRAINING PROGRAM

## 2025-02-07 PROCEDURE — 0UBC7ZX EXCISION OF CERVIX, VIA NATURAL OR ARTIFICIAL OPENING, DIAGNOSTIC: ICD-10-PCS | Performed by: STUDENT IN AN ORGANIZED HEALTH CARE EDUCATION/TRAINING PROGRAM

## 2025-02-07 RX ORDER — KETOROLAC TROMETHAMINE 30 MG/ML
INJECTION, SOLUTION INTRAMUSCULAR; INTRAVENOUS AS NEEDED
Status: DISCONTINUED | OUTPATIENT
Start: 2025-02-07 | End: 2025-02-07 | Stop reason: SURG

## 2025-02-07 RX ORDER — HYDROMORPHONE HYDROCHLORIDE 1 MG/ML
0.4 INJECTION, SOLUTION INTRAMUSCULAR; INTRAVENOUS; SUBCUTANEOUS EVERY 5 MIN PRN
Status: DISCONTINUED | OUTPATIENT
Start: 2025-02-07 | End: 2025-02-07

## 2025-02-07 RX ORDER — FAMOTIDINE 20 MG/1
20 TABLET, FILM COATED ORAL ONCE
Status: COMPLETED | OUTPATIENT
Start: 2025-02-07 | End: 2025-02-07

## 2025-02-07 RX ORDER — MORPHINE SULFATE 10 MG/ML
6 INJECTION, SOLUTION INTRAMUSCULAR; INTRAVENOUS EVERY 10 MIN PRN
Status: DISCONTINUED | OUTPATIENT
Start: 2025-02-07 | End: 2025-02-07

## 2025-02-07 RX ORDER — MORPHINE SULFATE 4 MG/ML
2 INJECTION, SOLUTION INTRAMUSCULAR; INTRAVENOUS EVERY 10 MIN PRN
Status: DISCONTINUED | OUTPATIENT
Start: 2025-02-07 | End: 2025-02-07

## 2025-02-07 RX ORDER — FAMOTIDINE 10 MG/ML
20 INJECTION, SOLUTION INTRAVENOUS ONCE
Status: COMPLETED | OUTPATIENT
Start: 2025-02-07 | End: 2025-02-07

## 2025-02-07 RX ORDER — LIDOCAINE HYDROCHLORIDE 10 MG/ML
INJECTION, SOLUTION EPIDURAL; INFILTRATION; INTRACAUDAL; PERINEURAL AS NEEDED
Status: DISCONTINUED | OUTPATIENT
Start: 2025-02-07 | End: 2025-02-07 | Stop reason: SURG

## 2025-02-07 RX ORDER — HYDROMORPHONE HYDROCHLORIDE 1 MG/ML
0.6 INJECTION, SOLUTION INTRAMUSCULAR; INTRAVENOUS; SUBCUTANEOUS EVERY 5 MIN PRN
Status: DISCONTINUED | OUTPATIENT
Start: 2025-02-07 | End: 2025-02-07

## 2025-02-07 RX ORDER — SODIUM CHLORIDE, SODIUM LACTATE, POTASSIUM CHLORIDE, CALCIUM CHLORIDE 600; 310; 30; 20 MG/100ML; MG/100ML; MG/100ML; MG/100ML
INJECTION, SOLUTION INTRAVENOUS CONTINUOUS
Status: DISCONTINUED | OUTPATIENT
Start: 2025-02-07 | End: 2025-02-07

## 2025-02-07 RX ORDER — IBUPROFEN 600 MG/1
600 TABLET, FILM COATED ORAL EVERY 6 HOURS PRN
Qty: 20 TABLET | Refills: 0 | Status: SHIPPED | OUTPATIENT
Start: 2025-02-07

## 2025-02-07 RX ORDER — HYDROMORPHONE HYDROCHLORIDE 1 MG/ML
0.2 INJECTION, SOLUTION INTRAMUSCULAR; INTRAVENOUS; SUBCUTANEOUS EVERY 5 MIN PRN
Status: DISCONTINUED | OUTPATIENT
Start: 2025-02-07 | End: 2025-02-07

## 2025-02-07 RX ORDER — HYDROCODONE BITARTRATE AND ACETAMINOPHEN 5; 325 MG/1; MG/1
1 TABLET ORAL ONCE
Status: COMPLETED | OUTPATIENT
Start: 2025-02-07 | End: 2025-02-07

## 2025-02-07 RX ORDER — MORPHINE SULFATE 4 MG/ML
4 INJECTION, SOLUTION INTRAMUSCULAR; INTRAVENOUS EVERY 10 MIN PRN
Status: DISCONTINUED | OUTPATIENT
Start: 2025-02-07 | End: 2025-02-07

## 2025-02-07 RX ORDER — DEXAMETHASONE SODIUM PHOSPHATE 4 MG/ML
VIAL (ML) INJECTION AS NEEDED
Status: DISCONTINUED | OUTPATIENT
Start: 2025-02-07 | End: 2025-02-07 | Stop reason: SURG

## 2025-02-07 RX ORDER — ACETAMINOPHEN 500 MG
1000 TABLET ORAL ONCE
Status: COMPLETED | OUTPATIENT
Start: 2025-02-07 | End: 2025-02-07

## 2025-02-07 RX ORDER — NALOXONE HYDROCHLORIDE 0.4 MG/ML
0.08 INJECTION, SOLUTION INTRAMUSCULAR; INTRAVENOUS; SUBCUTANEOUS AS NEEDED
Status: DISCONTINUED | OUTPATIENT
Start: 2025-02-07 | End: 2025-02-07

## 2025-02-07 RX ORDER — METOPROLOL TARTRATE 25 MG/1
25 TABLET, FILM COATED ORAL ONCE AS NEEDED
Status: DISCONTINUED | OUTPATIENT
Start: 2025-02-07 | End: 2025-02-07 | Stop reason: HOSPADM

## 2025-02-07 RX ORDER — ONDANSETRON 2 MG/ML
INJECTION INTRAMUSCULAR; INTRAVENOUS AS NEEDED
Status: DISCONTINUED | OUTPATIENT
Start: 2025-02-07 | End: 2025-02-07 | Stop reason: SURG

## 2025-02-07 RX ADMIN — ONDANSETRON 4 MG: 2 INJECTION INTRAMUSCULAR; INTRAVENOUS at 08:43:00

## 2025-02-07 RX ADMIN — KETOROLAC TROMETHAMINE 15 MG: 30 INJECTION, SOLUTION INTRAMUSCULAR; INTRAVENOUS at 09:13:00

## 2025-02-07 RX ADMIN — DEXAMETHASONE SODIUM PHOSPHATE 8 MG: 4 MG/ML VIAL (ML) INJECTION at 08:43:00

## 2025-02-07 RX ADMIN — SODIUM CHLORIDE, SODIUM LACTATE, POTASSIUM CHLORIDE, CALCIUM CHLORIDE: 600; 310; 30; 20 INJECTION, SOLUTION INTRAVENOUS at 09:28:00

## 2025-02-07 RX ADMIN — LIDOCAINE HYDROCHLORIDE 50 MG: 10 INJECTION, SOLUTION EPIDURAL; INFILTRATION; INTRACAUDAL; PERINEURAL at 08:39:00

## 2025-02-07 NOTE — ANESTHESIA POSTPROCEDURE EVALUATION
Patient: Elizabeth Godoy    Procedure Summary       Date: 02/07/25 Room / Location: [facility] MAIN OR 02 / EM MAIN OR    Anesthesia Start: 0832 Anesthesia Stop:     Procedure: Loop electrosurgical excision procedure and endocervical curettings (Vagina ) Diagnosis:       ABNER III (cervical intraepithelial neoplasia III)      (ABNER III (cervical intraepithelial neoplasia III) [D06.9])    Surgeons: Kacey Harvey MD Anesthesiologist: Jono Carnes MD    Anesthesia Type: MAC ASA Status: 2            Anesthesia Type: No value filed.    Vitals Value Taken Time   BP 97/61 02/07/25 0927   Temp 98.7F 02/07/25 0928   Pulse 75 02/07/25 0927   Resp 22 02/07/25 0927   SpO2 100 % 02/07/25 0927   Vitals shown include unfiled device data.    [facility] AN Post Evaluation:   Patient Evaluated in PACU  Patient Participation: complete - patient participated  Level of Consciousness: awake and alert  Pain Score: 0  Pain Management: adequate  Airway Patency:patent  Yes    Nausea/Vomiting: none  Cardiovascular Status: acceptable  Respiratory Status: acceptable  Postoperative Hydration acceptable      Omaira Gtz CRNA  2/7/2025 9:28 AM

## 2025-02-07 NOTE — ANESTHESIA PREPROCEDURE EVALUATION
Anesthesia PreOp Note    HPI:     Elizabeth Godoy is a 34 year old female who presents for preoperative consultation requested by: Kacey Harvey MD    Date of Surgery: 2025    Procedure(s):  Loop electrosurgical excision procedure  Indication: ABNER III (cervical intraepithelial neoplasia III) [D06.9]    Relevant Problems   No relevant active problems       NPO:  Last Liquid Consumption Date: 25  Last Liquid Consumption Time: 2330  Last Solid Consumption Date: 25  Last Solid Consumption Time: 2100  Last Liquid Consumption Date: 25          History Review:  Patient Active Problem List    Diagnosis Date Noted    Cervical high risk human papillomavirus (HPV) DNA test positive 2024    Need for vaccination 2024    Enlarged thyroid 2024    Annual physical exam 2024    Family history of cancer 2024    Positive MAZIN (antinuclear antibody) 2024    Episode of recurrent major depressive disorder 2023    Insomnia 2023    Mild intermittent asthma with acute exacerbation (HCC) 2018    Urinary body odor 2008       Past Medical History:    Anemia    Anxiety    Asthma (HCC)    Depression    IBS (irritable bowel syndrome)    Migraines    No aura    PONV (postoperative nausea and vomiting)    Visual impairment    contacts/glassees       Past Surgical History:   Procedure Laterality Date    Leep  2024    Other surgical history  2010    Pulinidal Cyst Removal       Prescriptions Prior to Admission[1]  Current Medications and Prescriptions Ordered in Epic[2]    Allergies[3]    Family History   Problem Relation Age of Onset    Cancer Father         Small Cell Carcinoma ()    Obesity Father     No Known Problems Mother     Cancer Maternal Grandmother         Alzheimers ()    Cancer Maternal Grandfather         Alzheimer's and Diabetes ()    Diabetes Maternal Grandfather     Cancer Paternal Grandmother         Breast Cancer  and Kidney Cancer ()    Breast Cancer Paternal Grandmother     Cancer Paternal Grandfather         Colon Cancer ()    Colon Cancer Paternal Grandfather     Breast Cancer Maternal Aunt      Social History     Socioeconomic History    Marital status: Single   Tobacco Use    Smoking status: Never    Smokeless tobacco: Never   Vaping Use    Vaping status: Never Used   Substance and Sexual Activity    Alcohol use: Yes     Alcohol/week: 4.0 standard drinks of alcohol     Types: 2 Glasses of wine, 2 Standard drinks or equivalent per week     Comment: occasional    Drug use: Yes     Frequency: 7.0 times per week     Types: Cannabis     Comment: Cannabis for anxiety, stress management, and aids with sleep. Edibles and smoking       Available pre-op labs reviewed.  Lab Results   Component Value Date    URINEPREG Negative 2025             Vital Signs:  Body mass index is 39.68 kg/m².   height is 1.6 m (5' 3\") and weight is 101.6 kg (224 lb). Her oral temperature is 97.9 °F (36.6 °C). Her blood pressure is 112/63 and her pulse is 84. Her respiration is 18 and oxygen saturation is 99%.   Vitals:    25 1624 25 0704   BP:  112/63   Pulse:  84   Resp:  18   Temp:  97.9 °F (36.6 °C)   TempSrc:  Oral   SpO2:  99%   Weight: 102.1 kg (225 lb) 101.6 kg (224 lb)   Height: 1.6 m (5' 3\") 1.6 m (5' 3\")        Anesthesia Evaluation      History of anesthetic complications   Airway   Mallampati: II  TM distance: >3 FB  Neck ROM: full  Dental - Dentition appears grossly intact     Pulmonary - normal exam   (+) asthma  Cardiovascular - negative ROS and normal exam    Neuro/Psych    (+)  anxiety/panic attacks,  depression      GI/Hepatic/Renal - negative ROS     Endo/Other - negative ROS   Abdominal  - normal exam                 Anesthesia Plan:   ASA:  2  Plan:   MAC  Plan Comments: Anesthesia plan was discussed with the patient, going through the rationale, expectations, benefits and risks namely injury to  lips, teeth, gyms or corneas, risks of an allergic reaction, risk of awareness or recall of intra-operative events, risk of prolonged intubation and ICU admission, risks of kidney failure, risk of coronary events or dysrhythmias, risk of cerebrovascular events or stroke and on rare occasions death.         I have informed Elizabeth Godoy and/or legal guardian or family member of the nature of the anesthetic plan, benefits, risks including possible dental damage if relevant, major complications, and any alternative forms of anesthetic management.   All of the patient's questions were answered to the best of my ability. The patient desires the anesthetic management as planned.  Jono Carnes MD  2/7/2025 8:05 AM  Present on Admission:  **None**           [1]   Medications Prior to Admission   Medication Sig Dispense Refill Last Dose/Taking    FLUoxetine 20 MG Oral Cap    2/6/2025 at  8:00 AM    Drospirenone-Ethinyl Estradiol 3-0.02 MG Oral Tab Take 1 tablet by mouth daily. 84 each 4 2/6/2025 at  8:00 PM    hydrOXYzine 25 MG Oral Tab Take 1 tablet by mouth every evening as needed for anxiety/insomnia   Past Week    propranolol 40 MG Oral Tab Take 1 tablet (40 mg total) by mouth 2 (two) times daily as needed.   Past Month    albuterol 108 (90 Base) MCG/ACT Inhalation Aero Soln Inhale 2 puffs into the lungs every 6 (six) hours as needed for Wheezing or Shortness of Breath. 18 g 3 2/7/2025 Morning    FLUoxetine HCl 20 MG Oral Tab Take 1 tablet (20 mg total) by mouth daily. (Patient taking differently: Take 1 tablet (20 mg total) by mouth every morning.) 90 tablet 3 2/6/2025 at  8:00 AM    SUMAtriptan Succinate (IMITREX) 100 MG Oral Tab Take 1 tablet (100 mg total) by mouth every 2 (two) hours as needed for Migraine. Use at onset; repeat once after 2 HRS-ONLY 2 IN 24 HR MAX 9 tablet 1 Past Month    Neomycin-Polymyxin-Dexameth 0.1 % Ophthalmic Ointment APPLY One inch RIBBON INT BOTH EYES THREE TIMES A DAY FOR 1 WEEK    Past Month    neomycin-polymyxin-dexamethasone 3.5-16311-3.1 Ophthalmic Ointment    Past Month    Budesonide-Formoterol Fumarate (SYMBICORT) 80-4.5 MCG/ACT Inhalation Aerosol Inhale 2 puffs into the lungs 2 (two) times daily. 1 Inhaler 4 Past Week    PROAIR  (90 Base) MCG/ACT Inhalation Aero Soln INHALE 2 PUFFS INTO THE LUNGS EVERY 6 HOURS AS NEEDED FOR WHEEZING OR SHORTNESS OF BREATH 8.5 g 1 Past Month    ibuprofen 600 MG Oral Tab Take 1 tablet (600 mg total) by mouth every 6 (six) hours as needed for Pain. To be taken with food 45 tablet 0 More than a month   [2]   Current Facility-Administered Medications Ordered in Epic   Medication Dose Route Frequency Provider Last Rate Last Admin    lactated ringers infusion   Intravenous Continuous Kacey Harvey MD 20 mL/hr at 02/07/25 0707 New Bag at 02/07/25 0707    metoprolol tartrate (Lopressor) tab 25 mg  25 mg Oral Once PRN Kacey Harvey MD         No current [facility]-ordered outpatient medications on file.   [3]   Allergies  Allergen Reactions    Dust Mite Extract OTHER (SEE COMMENTS)     Stuffy, watery eyes

## 2025-02-07 NOTE — H&P
[facility]  part of [facility]        HISTORY AND PHYSICAL        Subjective   Chief Complaint:  CIN2-3      History of Present Illness:    Elizabeth Godoy is a  34 year old y/o  who presents for scheduled gyn procedure  LEEP (top hat with ECC) after previous micro foci of ABNER 3 on initial LEEP, with concern for possible skip lesions.  The patients complaints include none.          Past Medical History:    Anemia    Anxiety    Asthma (HCC)    Depression    IBS (irritable bowel syndrome)    Migraines    No aura    PONV (postoperative nausea and vomiting)    Visual impairment    contacts/glassees       Past Surgical History:   Procedure Laterality Date    Leep  2024    Other surgical history  2010    Pulinidal Cyst Removal       OB History    Para Term  AB Living   0 0 0 0 0 0   SAB IAB Ectopic Multiple Live Births   0 0 0 0 0       Allergies[1]    No current outpatient medications on file.      Family History   Problem Relation Age of Onset    Cancer Father         Small Cell Carcinoma ()    Obesity Father     No Known Problems Mother     Cancer Maternal Grandmother         Alzheimers ()    Cancer Maternal Grandfather         Alzheimer's and Diabetes ()    Diabetes Maternal Grandfather     Cancer Paternal Grandmother         Breast Cancer and Kidney Cancer ()    Breast Cancer Paternal Grandmother     Cancer Paternal Grandfather         Colon Cancer ()    Colon Cancer Paternal Grandfather     Breast Cancer Maternal Aunt          REVIEW OF SYSTEMS:   CONSTITUTIONAL: Negative for fever, chills, diaphoresis, weakness, fatigue, weight loss, weight gain.  ALLERGIES: Negative for urticaria, hay fever, angioedema  EYES: Negative for blurry vision, decreased vision, loss of vision, eye pain, diplopia, photophobia, discharge  ENT: Negative for sore throat, nasal congestion, nasal discharge, epistaxis, tinnitus, hearing  loss  CARDIOVASCULAR: Negative for chest pain, dyspnea on exertion, orthopnea, paroxysmal nocturnal dyspnea, edema, palpitations  RESPIRATORY: Negative for cough, hemoptysis, shortness of breath, pleuritic chest pain, wheezing  BREAST:  Denies breast mass, breast pain, nipple discharge or nipple pain.  ENDOCRINE: Negative for polydipsia/polyuria, palpitations, skin changes, temperature intolerance, unexpected weight changes  HEME-LYMPH: Negative for swollen lymph nodes, bleeding, bruising  GI: Negative abdominal pain, flank pain, nausea, vomiting, diarrhea, constipation, black stool, blood in stool  : Negative for dysuria, frequency/urgency, hematuria, genital discharge, vaginal bleeding, irregular menses, heavy menses, pelvic pain  NEURO: Negative for dizzy/vertigo, headache, focal weakness, numbness/tingling, speech problems, loss of consciousness, confusion, memory loss  MUSCULOSKELETAL: Negative for back pain, joint pain, joint stiffness, joint swelling, muscle pain, muscle weakness  SKIN: Negative for rash, itching, hives  PSYCH: Negative for anxiety, depression, physical abuse, sexual abuse      PHYSICAL EXAM:    /63 (BP Location: Right arm)   Pulse 84   Temp 97.9 °F (36.6 °C) (Oral)   Resp 18   Ht 5' 3\" (1.6 m)   Wt 224 lb (101.6 kg)   LMP 02/02/2025 (Exact Date)   SpO2 99%   BMI 39.68 kg/m²     Physical Exam  Vitals reviewed.   Constitutional:       General: She is not in acute distress.  HENT:      Head: Normocephalic and atraumatic.   Eyes:      Extraocular Movements: Extraocular movements intact.   Pulmonary:      Effort: Pulmonary effort is normal.   Genitourinary:     Comments: Deferred to OR  Musculoskeletal:      Cervical back: Normal range of motion.   Skin:     General: Skin is warm and dry.   Neurological:      General: No focal deficit present.      Mental Status: She is alert.   Psychiatric:         Mood and Affect: Mood normal.         Behavior: Behavior normal.             Lab  Results   Component Value Date    WBC 8.8 08/26/2024    HGB 13.4 08/26/2024    HCT 41.4 08/26/2024    .0 08/26/2024    MCV 90.0 08/26/2024    RDW 11.9 08/26/2024     No components found for: \"ABOGROUP\", \"RHTYPE\", \"RUBIGG\"            Assessment and Plan:      Active Problems:    * No active hospital problems. *        The patient was counseled regarding surgery and the procedure (LEEP top hat with ECC) was reviewed at length.   Risks of procedure including bleeding/need for blood transfusion (<1%), infection (5-10%), damage to other organs/bowel/bladder/ureters (<1%),  and anesthesia were reviewed.  Benefits, alternatives, & indications were also discussed.  All questions were answered.  Written information was provided.      Kacey Harvey MD         [1]   Allergies  Allergen Reactions    Dust Mite Extract OTHER (SEE COMMENTS)     Stuffy, watery eyes

## 2025-02-07 NOTE — OPERATIVE REPORT
Pre-Operative Diagnosis: ABNER III (cervical intraepithelial neoplasia III) [D06.9]     Post-Operative Diagnosis: ABNER III (cervical intraepithelial neoplasia III) [D06.9]      Procedure Performed:   Loop electrosurgical excision procedure and endocervical curettings    Surgeons and Role:     * Kacey Harvey MD - Primary    Assistant(s):        Surgical Findings: Normal appearing cervix, well healed after previous LEEP. No masses.      Specimen: LEEP of endocervix (top hat), ECC     Estimated Blood Loss: 5 mL     Indication:   34-year-old female previously diagnosed with 18 who had an office LEEP demonstrating micro foci of ABNER-3 on pathology despite normal cytology on her previous Pap test.  Reviewed the distribution of this raise suspicion possibility of skin lesions plan for nuclear endocervical sampling via LEEP top hat as well as other endocervical curettings.  Risks benefits and alternatives of surgery previously reviewed with patient in office and reaffirmed in preop area.  Informed consent obtained and written consent signed.    Procedure:   Patient was taken to the operating room where she underwent MAC anesthesia without difficulty, and was then positioned in dorsal lithotomy in Darshan stirrups with care to avoid nerve compression.  She was then prepped and draped in normal sterile fashion and timeout performed.  Ceramic speculum inserted and cervix visualized with ceramic tenaculum applied.  Cervical block and paracervical block performed with 1% lidocaine with epinephrine for analgesia and hemostasis.  A loop electrode was then used to make a targeted sample of the endocervical canal with subsequent endocervical curettings.  No lesions or masses were appreciated and was minimal bleeding.  Hemostasis was ensured with rollerball cautery and silver nitrate.  All instruments removed from the vagina no bleeding appreciated.  Patient awakened from anesthesia and transported to the recovery area in good  condition.       Kacey Harvey MD  2/7/2025  9:37 AM

## 2025-02-12 ENCOUNTER — NURSE ONLY (OUTPATIENT)
Dept: OBGYN CLINIC | Facility: CLINIC | Age: 35
End: 2025-02-12
Payer: COMMERCIAL

## 2025-02-12 VITALS
HEIGHT: 63 IN | WEIGHT: 224 LBS | DIASTOLIC BLOOD PRESSURE: 85 MMHG | SYSTOLIC BLOOD PRESSURE: 125 MMHG | BODY MASS INDEX: 39.69 KG/M2

## 2025-02-12 DIAGNOSIS — Z23 NEED FOR HPV VACCINE: Primary | ICD-10-CM

## 2025-02-12 PROBLEM — D06.9 HIGH GRADE SQUAMOUS INTRAEPITHELIAL LESION (HGSIL), GRADE 3 CIN, ON BIOPSY OF CERVIX: Status: ACTIVE | Noted: 2025-02-12

## 2025-02-12 LAB
CONTROL LINE PRESENT WITH A CLEAR BACKGROUND (YES/NO): YES YES/NO
PREGNANCY TEST, URINE: NEGATIVE

## 2025-02-12 PROCEDURE — 81025 URINE PREGNANCY TEST: CPT | Performed by: OBSTETRICS & GYNECOLOGY

## 2025-02-12 PROCEDURE — 3079F DIAST BP 80-89 MM HG: CPT | Performed by: OBSTETRICS & GYNECOLOGY

## 2025-02-12 PROCEDURE — 90651 9VHPV VACCINE 2/3 DOSE IM: CPT | Performed by: OBSTETRICS & GYNECOLOGY

## 2025-02-12 PROCEDURE — 3074F SYST BP LT 130 MM HG: CPT | Performed by: OBSTETRICS & GYNECOLOGY

## 2025-02-12 PROCEDURE — 90471 IMMUNIZATION ADMIN: CPT | Performed by: OBSTETRICS & GYNECOLOGY

## 2025-02-12 PROCEDURE — 3008F BODY MASS INDEX DOCD: CPT | Performed by: OBSTETRICS & GYNECOLOGY

## 2025-02-12 NOTE — PROGRESS NOTES
Patient came in for 2nd hpv vaccine, vitals 125/85 , urine pregnancy test negative, informed patient next injection in  3 months patient agreed with plan, patient took injection well ,

## 2025-02-18 ENCOUNTER — OFFICE VISIT (OUTPATIENT)
Dept: OBGYN CLINIC | Facility: CLINIC | Age: 35
End: 2025-02-18

## 2025-02-18 VITALS
HEIGHT: 63 IN | WEIGHT: 223 LBS | SYSTOLIC BLOOD PRESSURE: 119 MMHG | HEART RATE: 102 BPM | DIASTOLIC BLOOD PRESSURE: 83 MMHG | BODY MASS INDEX: 39.51 KG/M2

## 2025-02-18 DIAGNOSIS — N89.8 VAGINAL ODOR: Primary | ICD-10-CM

## 2025-02-18 DIAGNOSIS — R87.810 CERVICAL HIGH RISK HUMAN PAPILLOMAVIRUS (HPV) DNA TEST POSITIVE: ICD-10-CM

## 2025-02-18 PROCEDURE — 3074F SYST BP LT 130 MM HG: CPT | Performed by: STUDENT IN AN ORGANIZED HEALTH CARE EDUCATION/TRAINING PROGRAM

## 2025-02-18 PROCEDURE — 3008F BODY MASS INDEX DOCD: CPT | Performed by: STUDENT IN AN ORGANIZED HEALTH CARE EDUCATION/TRAINING PROGRAM

## 2025-02-18 PROCEDURE — 3079F DIAST BP 80-89 MM HG: CPT | Performed by: STUDENT IN AN ORGANIZED HEALTH CARE EDUCATION/TRAINING PROGRAM

## 2025-02-18 PROCEDURE — 99024 POSTOP FOLLOW-UP VISIT: CPT | Performed by: STUDENT IN AN ORGANIZED HEALTH CARE EDUCATION/TRAINING PROGRAM

## 2025-02-19 NOTE — ASSESSMENT & PLAN NOTE
In process of getting her HPV vaccinations and will initiate [facility] treatment. Surgical pathology reassuring. Will repeat Pap + cotesting in 6 months. Had some questions about odor and discharge post-operatively; less likely BV on exam but swab sent given BV can impede HPV clearance.

## 2025-02-19 NOTE — PROGRESS NOTES
[facility]  Obstetrics and Gynecology  Gynecology Established Problem Exam    Chief Complaint   Patient presents with    Follow - Up     Pt here for follow up for results on pathology.            Elizabeth Godoy is a 34 year old female presenting for Follow - Up (Pt here for follow up for results on pathology. )   .     No more bleeding or pain. Has not been sexually active. Noticed a bit of discharge with an unusual odor recently; uncertain if BV. Planning to start [facility] supplements, and is receiving her HPV vaccinations.       Medications (Active prior to today's visit):  Current Outpatient Medications   Medication Sig Dispense Refill    ibuprofen 600 MG Oral Tab Take 1 tablet (600 mg total) by mouth every 6 (six) hours as needed for Pain. To be taken with food 20 tablet 0    FLUoxetine 20 MG Oral Cap       Drospirenone-Ethinyl Estradiol 3-0.02 MG Oral Tab Take 1 tablet by mouth daily. 84 each 4    hydrOXYzine 25 MG Oral Tab Take 1 tablet by mouth every evening as needed for anxiety/insomnia      propranolol 40 MG Oral Tab Take 1 tablet (40 mg total) by mouth 2 (two) times daily as needed.      albuterol 108 (90 Base) MCG/ACT Inhalation Aero Soln Inhale 2 puffs into the lungs every 6 (six) hours as needed for Wheezing or Shortness of Breath. 18 g 3    FLUoxetine HCl 20 MG Oral Tab Take 1 tablet (20 mg total) by mouth daily. (Patient taking differently: Take 1 tablet (20 mg total) by mouth every morning.) 90 tablet 3    SUMAtriptan Succinate (IMITREX) 100 MG Oral Tab Take 1 tablet (100 mg total) by mouth every 2 (two) hours as needed for Migraine. Use at onset; repeat once after 2 HRS-ONLY 2 IN 24 HR MAX 9 tablet 1    Neomycin-Polymyxin-Dexameth 0.1 % Ophthalmic Ointment APPLY One inch RIBBON INT BOTH EYES THREE TIMES A DAY FOR 1 WEEK      neomycin-polymyxin-dexamethasone 3.5-96145-7.1 Ophthalmic Ointment       Budesonide-Formoterol Fumarate (SYMBICORT) 80-4.5 MCG/ACT Inhalation Aerosol Inhale 2 puffs into the lungs 2  (two) times daily. 1 Inhaler 4    PROAIR  (90 Base) MCG/ACT Inhalation Aero Soln INHALE 2 PUFFS INTO THE LUNGS EVERY 6 HOURS AS NEEDED FOR WHEEZING OR SHORTNESS OF BREATH 8.5 g 1     Allergies:  Allergies[1]  HISTORY:     OB History    Para Term  AB Living   0 0 0 0 0 0   SAB IAB Ectopic Multiple Live Births   0 0 0 0 0                 Past Medical History:    Anemia    Anxiety    Asthma (HCC)    Depression    IBS (irritable bowel syndrome)    Migraines    No aura    PONV (postoperative nausea and vomiting)    Visual impairment    contacts/glassees       Past Surgical History:   Procedure Laterality Date    Leep  2024    Other surgical history  2010    Pulinidal Cyst Removal       Family History   Problem Relation Age of Onset    Cancer Father         Small Cell Carcinoma ()    Obesity Father     No Known Problems Mother     Cancer Maternal Grandmother         Alzheimers ()    Cancer Maternal Grandfather         Alzheimer's and Diabetes ()    Diabetes Maternal Grandfather     Cancer Paternal Grandmother         Breast Cancer and Kidney Cancer ()    Breast Cancer Paternal Grandmother     Cancer Paternal Grandfather         Colon Cancer ()    Colon Cancer Paternal Grandfather     Breast Cancer Maternal Aunt        Social History     Socioeconomic History    Marital status: Single     Spouse name: Not on file    Number of children: Not on file    Years of education: Not on file    Highest education level: Not on file   Occupational History    Not on file   Tobacco Use    Smoking status: Never     Passive exposure: Never    Smokeless tobacco: Never   Vaping Use    Vaping status: Never Used   Substance and Sexual Activity    Alcohol use: Yes     Alcohol/week: 4.0 standard drinks of alcohol     Types: 2 Glasses of wine, 2 Standard drinks or equivalent per week     Comment: occasional    Drug use: Yes     Frequency: 7.0 times per week     Types: Cannabis      Comment: Cannabis for anxiety, stress management, and aids with sleep. Edibles and smoking    Sexual activity: Not on file   Other Topics Concern    Not on file   Social History Narrative    Not on file     Social Drivers of Health     Food Insecurity: Not on file   Transportation Needs: Not on file   Stress: Not on file   Housing Stability: Not on file       ROS:   Review of Systems:    General: no fevers, chills, unintended weight loss/gain except as above  Cardiovascular: no chest pain, new or unexplained SOB except as above  Gastrointestinal: no nausea/vomiting, diarrhea, or blood in stool except as above  Respiratory: no new symptoms reported except as above  Skin: no new symptoms reported except as above  Psychiatric: no new symptoms reported except as above  PHYSICAL EXAM:   /83   Pulse 102   Ht 5' 3\" (1.6 m)   Wt 223 lb (101.2 kg)   LMP 02/02/2025 (Exact Date)   Breastfeeding No   BMI 39.50 kg/m²     Physical Exam  HENT:      Head: Normocephalic and atraumatic.   Eyes:      Extraocular Movements: Extraocular movements intact.   Pulmonary:      Effort: Pulmonary effort is normal.   Abdominal:      Palpations: Abdomen is soft.   Genitourinary:     Comments: Normal vulva. Cervix with signs of recent LEEP but healing well; no bleeding. Small discharge likely from healing tissue in vagina without significant odor; vaginitis swab collected.   Musculoskeletal:      Cervical back: Normal range of motion.   Skin:     General: Skin is warm and dry.   Neurological:      General: No focal deficit present.      Mental Status: She is alert. Mental status is at baseline.   Psychiatric:         Behavior: Behavior normal.         Thought Content: Thought content normal.              RESULTS & IMAGING     2/7/2025   Final Diagnosis:      A. Cervical LEEP:   Fragments of benign squamous mucosa  Fragments of benign endocervical mucosa  No evidence of cervical intraepithelial neoplasia (ABNER)      B. Endocervical  curettings:   Fragments of benign endocervix  No evidence of cervical intraepithelial neoplasia (ABNER)      12/2024:  Final Diagnosis:      A. Endocervix; curettage:  Minute fragments of endocervical and ectocervical mucosa in a background of extensive blood.  No dysplasia identified.     B. Cervix; LEEP:  Transformation zone with multiple small foci of high-grade intraepithelial lesion (ABNER-3).  Specimen margins negative for dysplasia.       Lab Results   Component Value Date    GYNINT (A) 11/20/2024     Negative for intraepithelial lesion or malignancy - Positive for HPV    GYNINT Negative for intraepithelial lesion or malignancy 11/20/2021    GYNINT Negative for intraepithelial lesion or malignancy 07/10/2018    HPVR Positive (A) 11/20/2024             No results for input(s): \"URINEPREG\" in the last 72 hours.    No results for input(s): \"PGLU\", \"POCTGLUCOSE\" in the last 72 hours.    No results for input(s): \"GLUCOSEDIP\", \"BILIRUBIN\", \"KETONESDIP\", \"BLOODU\", \"PHURINE\", \"UROBILIN\", \"NITRITE\", \"LEUKOCYTES\", \"APPEARANCE\", \"URINECOLOR\" in the last 72 hours.    Invalid input(s): \"SPECGRAV\"       ASSESSMENT & PLAN     Vaginal odor (Primary)  -     Vaginitis Vaginosis PCR Panel; Future; Expected date: 02/18/2025  -     Vaginitis Vaginosis PCR Panel  Cervical high risk human papillomavirus (HPV) DNA test positive  Assessment & Plan:  In process of getting her HPV vaccinations and will initiate [facility] treatment. Surgical pathology reassuring. Will repeat Pap + cotesting in 6 months. Had some questions about odor and discharge post-operatively; less likely BV on exam but swab sent given BV can impede HPV clearance.       F/u 6 months for cotesting    Kacey Harvey MD  2/18/2025  9:36 PM               [1]   Allergies  Allergen Reactions    Dust Mite Extract OTHER (SEE COMMENTS)     Stuffy, watery eyes

## 2025-02-19 NOTE — ASSESSMENT & PLAN NOTE
Microfoci on previous LEEP; further sampling of endocervical canal to r/o skip lesions is negative.

## 2025-02-28 NOTE — PROGRESS NOTES
[facility]  Obstetrics and Gynecology  Gynecology Established Problem Exam    Chief Complaint   Patient presents with    Follow - Up     [facility] follow up            Elizabeth Godoy is a 34 year old female presenting for Follow - Up ([facility] follow up )   .     Recovering well. No significant issues at this time; no further bleeding.       Medications (Active prior to today's visit):  Current Outpatient Medications   Medication Sig Dispense Refill    FLUoxetine 20 MG Oral Cap       Drospirenone-Ethinyl Estradiol 3-0.02 MG Oral Tab Take 1 tablet by mouth daily. 84 each 4    hydrOXYzine 25 MG Oral Tab Take 1 tablet by mouth every evening as needed for anxiety/insomnia      propranolol 40 MG Oral Tab Take 1 tablet (40 mg total) by mouth 2 (two) times daily as needed.      albuterol 108 (90 Base) MCG/ACT Inhalation Aero Soln Inhale 2 puffs into the lungs every 6 (six) hours as needed for Wheezing or Shortness of Breath. 18 g 3    FLUoxetine HCl 20 MG Oral Tab Take 1 tablet (20 mg total) by mouth daily. (Patient taking differently: Take 1 tablet (20 mg total) by mouth every morning.) 90 tablet 3    SUMAtriptan Succinate (IMITREX) 100 MG Oral Tab Take 1 tablet (100 mg total) by mouth every 2 (two) hours as needed for Migraine. Use at onset; repeat once after 2 HRS-ONLY 2 IN 24 HR MAX 9 tablet 1    Neomycin-Polymyxin-Dexameth 0.1 % Ophthalmic Ointment APPLY One inch RIBBON INT BOTH EYES THREE TIMES A DAY FOR 1 WEEK      neomycin-polymyxin-dexamethasone 3.5-12181-2.1 Ophthalmic Ointment       Budesonide-Formoterol Fumarate (SYMBICORT) 80-4.5 MCG/ACT Inhalation Aerosol Inhale 2 puffs into the lungs 2 (two) times daily. 1 Inhaler 4    PROAIR  (90 Base) MCG/ACT Inhalation Aero Soln INHALE 2 PUFFS INTO THE LUNGS EVERY 6 HOURS AS NEEDED FOR WHEEZING OR SHORTNESS OF BREATH 8.5 g 1    ibuprofen 600 MG Oral Tab Take 1 tablet (600 mg total) by mouth every 6 (six) hours as needed for Pain. To be taken with food 20 tablet 0      Allergies:  Allergies[1]  HISTORY:     OB History    Para Term  AB Living   0 0 0 0 0 0   SAB IAB Ectopic Multiple Live Births   0 0 0 0 0                 Past Medical History:    Anemia    Anxiety    Asthma (HCC)    Depression    IBS (irritable bowel syndrome)    Migraines    No aura    PONV (postoperative nausea and vomiting)    Visual impairment    contacts/glassees       Past Surgical History:   Procedure Laterality Date    Leep  2024    Other surgical history  2010    Pulinidal Cyst Removal       Family History   Problem Relation Age of Onset    Cancer Father         Small Cell Carcinoma ()    Obesity Father     No Known Problems Mother     Cancer Maternal Grandmother         Alzheimers ()    Cancer Maternal Grandfather         Alzheimer's and Diabetes ()    Diabetes Maternal Grandfather     Cancer Paternal Grandmother         Breast Cancer and Kidney Cancer ()    Breast Cancer Paternal Grandmother     Cancer Paternal Grandfather         Colon Cancer ()    Colon Cancer Paternal Grandfather     Breast Cancer Maternal Aunt        Social History     Socioeconomic History    Marital status: Single     Spouse name: Not on file    Number of children: Not on file    Years of education: Not on file    Highest education level: Not on file   Occupational History    Not on file   Tobacco Use    Smoking status: Never     Passive exposure: Never    Smokeless tobacco: Never   Vaping Use    Vaping status: Never Used   Substance and Sexual Activity    Alcohol use: Yes     Alcohol/week: 4.0 standard drinks of alcohol     Types: 2 Glasses of wine, 2 Standard drinks or equivalent per week     Comment: occasional    Drug use: Yes     Frequency: 7.0 times per week     Types: Cannabis     Comment: Cannabis for anxiety, stress management, and aids with sleep. Edibles and smoking    Sexual activity: Not on file   Other Topics Concern    Not on file   Social History  Narrative    Not on file     Social Drivers of Health     Food Insecurity: Not on file   Transportation Needs: Not on file   Stress: Not on file   Housing Stability: Not on file       ROS:   Review of Systems:    General: no fevers, chills, unintended weight loss/gain except as above  Cardiovascular: no chest pain, new or unexplained SOB except as above  Gastrointestinal: no nausea/vomiting, diarrhea, or blood in stool except as above  Respiratory: no new symptoms reported except as above  Skin: no new symptoms reported except as above  Psychiatric: no new symptoms reported except as above  PHYSICAL EXAM:   /74   Ht 5' 3\" (1.6 m)   Wt 225 lb (102.1 kg)   LMP 01/06/2025   BMI 39.86 kg/m²     Physical Exam  HENT:      Head: Normocephalic and atraumatic.   Eyes:      Extraocular Movements: Extraocular movements intact.   Pulmonary:      Effort: Pulmonary effort is normal.   Abdominal:      Palpations: Abdomen is soft.   Genitourinary:     Comments: Deferred  Musculoskeletal:      Cervical back: Normal range of motion.   Skin:     General: Skin is warm and dry.   Neurological:      General: No focal deficit present.      Mental Status: She is alert. Mental status is at baseline.   Psychiatric:         Behavior: Behavior normal.         Thought Content: Thought content normal.              RESULTS & IMAGING     12/20/24 LEEP   Final Diagnosis:      A. Endocervix; curettage:  Minute fragments of endocervical and ectocervical mucosa in a background of extensive blood.  No dysplasia identified.     B. Cervix; LEEP:  Transformation zone with multiple small foci of high-grade intraepithelial lesion (ABNER-3).  Specimen margins negative for dysplasia.       No results for input(s): \"URINEPREG\" in the last 72 hours.    No results for input(s): \"PGLU\", \"POCTGLUCOSE\" in the last 72 hours.    No results for input(s): \"GLUCOSEDIP\", \"BILIRUBIN\", \"KETONESDIP\", \"BLOODU\", \"PHURINE\", \"UROBILIN\", \"NITRITE\", \"LEUKOCYTES\",  \"APPEARANCE\", \"URINECOLOR\" in the last 72 hours.    Invalid input(s): \"SPECGRAV\"       ASSESSMENT & PLAN     There are no diagnoses linked to this encounter.    Plan for repeat LEEP with top hat for improved sampling of endocervix given pathology findings.   Message sent to     Kacey Harvey MD  2/28/2025  12:31 PM                 [1]   Allergies  Allergen Reactions    Dust Mite Extract OTHER (SEE COMMENTS)     Stuffy, watery eyes

## 2025-06-08 DIAGNOSIS — R76.8 POSITIVE ANA (ANTINUCLEAR ANTIBODY): ICD-10-CM

## 2025-06-08 DIAGNOSIS — Z80.9 FAMILY HISTORY OF CANCER: ICD-10-CM

## 2025-06-08 DIAGNOSIS — Z00.00 ANNUAL PHYSICAL EXAM: ICD-10-CM

## 2025-06-08 DIAGNOSIS — J45.21 MILD INTERMITTENT ASTHMA WITH ACUTE EXACERBATION (HCC): ICD-10-CM

## 2025-06-08 DIAGNOSIS — E04.9 ENLARGED THYROID: ICD-10-CM

## 2025-06-08 DIAGNOSIS — Z76.0 MEDICATION REFILL: ICD-10-CM

## 2025-06-08 DIAGNOSIS — F33.9 EPISODE OF RECURRENT MAJOR DEPRESSIVE DISORDER, UNSPECIFIED DEPRESSION EPISODE SEVERITY: ICD-10-CM

## 2025-06-08 DIAGNOSIS — Z23 NEED FOR VACCINATION: ICD-10-CM

## 2025-06-10 RX ORDER — ALBUTEROL SULFATE 90 UG/1
2 INHALANT RESPIRATORY (INHALATION) EVERY 6 HOURS PRN
Qty: 1 EACH | Refills: 2 | Status: SHIPPED | OUTPATIENT
Start: 2025-06-10

## 2025-06-10 NOTE — TELEPHONE ENCOUNTER
Please review: medication fails/has no protocol attached.    No future appointments with primary care medicine   Last office visit: 8/26/24

## 2025-06-12 ENCOUNTER — NURSE ONLY (OUTPATIENT)
Dept: OBGYN CLINIC | Facility: CLINIC | Age: 35
End: 2025-06-12
Payer: COMMERCIAL

## 2025-06-12 VITALS
DIASTOLIC BLOOD PRESSURE: 68 MMHG | WEIGHT: 223 LBS | HEIGHT: 63 IN | SYSTOLIC BLOOD PRESSURE: 108 MMHG | BODY MASS INDEX: 39.51 KG/M2

## 2025-06-12 DIAGNOSIS — Z23 NEED FOR HPV VACCINE: Primary | ICD-10-CM

## 2025-06-12 PROCEDURE — 3078F DIAST BP <80 MM HG: CPT | Performed by: OBSTETRICS & GYNECOLOGY

## 2025-06-12 PROCEDURE — 90471 IMMUNIZATION ADMIN: CPT | Performed by: OBSTETRICS & GYNECOLOGY

## 2025-06-12 PROCEDURE — 90651 9VHPV VACCINE 2/3 DOSE IM: CPT | Performed by: OBSTETRICS & GYNECOLOGY

## 2025-06-12 PROCEDURE — 3008F BODY MASS INDEX DOCD: CPT | Performed by: OBSTETRICS & GYNECOLOGY

## 2025-06-12 PROCEDURE — 3074F SYST BP LT 130 MM HG: CPT | Performed by: OBSTETRICS & GYNECOLOGY

## 2025-06-12 PROCEDURE — 81025 URINE PREGNANCY TEST: CPT | Performed by: OBSTETRICS & GYNECOLOGY

## 2025-08-11 ENCOUNTER — OFFICE VISIT (OUTPATIENT)
Dept: OBGYN CLINIC | Facility: CLINIC | Age: 35
End: 2025-08-11

## 2025-08-11 VITALS
DIASTOLIC BLOOD PRESSURE: 79 MMHG | HEART RATE: 93 BPM | BODY MASS INDEX: 37 KG/M2 | WEIGHT: 210 LBS | SYSTOLIC BLOOD PRESSURE: 103 MMHG

## 2025-08-11 DIAGNOSIS — N89.8 VAGINAL DISCHARGE: ICD-10-CM

## 2025-08-11 DIAGNOSIS — R87.810 CERVICAL HIGH RISK HUMAN PAPILLOMAVIRUS (HPV) DNA TEST POSITIVE: ICD-10-CM

## 2025-08-11 DIAGNOSIS — Z12.4 SCREENING FOR CERVICAL CANCER: Primary | ICD-10-CM

## 2025-08-11 PROCEDURE — 99212 OFFICE O/P EST SF 10 MIN: CPT | Performed by: STUDENT IN AN ORGANIZED HEALTH CARE EDUCATION/TRAINING PROGRAM

## 2025-08-11 PROCEDURE — 3074F SYST BP LT 130 MM HG: CPT | Performed by: STUDENT IN AN ORGANIZED HEALTH CARE EDUCATION/TRAINING PROGRAM

## 2025-08-11 PROCEDURE — 3078F DIAST BP <80 MM HG: CPT | Performed by: STUDENT IN AN ORGANIZED HEALTH CARE EDUCATION/TRAINING PROGRAM

## 2025-08-12 ENCOUNTER — TELEPHONE (OUTPATIENT)
Dept: INTERNAL MEDICINE CLINIC | Facility: CLINIC | Age: 35
End: 2025-08-12

## 2025-08-12 DIAGNOSIS — E04.9 ENLARGED THYROID: ICD-10-CM

## 2025-08-12 DIAGNOSIS — IMO0001 SMOKING: ICD-10-CM

## 2025-08-12 DIAGNOSIS — IMO0001 MODERATE INTERMITTENT ASTHMA WITHOUT COMPLICATION: ICD-10-CM

## 2025-08-12 DIAGNOSIS — Z00.00 EXAMINATION: ICD-10-CM

## 2025-08-12 DIAGNOSIS — Z80.9 FAMILY HISTORY OF CANCER: ICD-10-CM

## 2025-08-12 DIAGNOSIS — J45.21 MILD INTERMITTENT ASTHMA WITH ACUTE EXACERBATION (HCC): ICD-10-CM

## 2025-08-12 DIAGNOSIS — F33.9 EPISODE OF RECURRENT MAJOR DEPRESSIVE DISORDER, UNSPECIFIED DEPRESSION EPISODE SEVERITY: ICD-10-CM

## 2025-08-12 DIAGNOSIS — F32.A ANXIETY AND DEPRESSION: ICD-10-CM

## 2025-08-12 DIAGNOSIS — R76.8 POSITIVE ANA (ANTINUCLEAR ANTIBODY): ICD-10-CM

## 2025-08-12 DIAGNOSIS — Z76.0 MEDICATION REFILL: ICD-10-CM

## 2025-08-12 DIAGNOSIS — Z00.00 ANNUAL PHYSICAL EXAM: ICD-10-CM

## 2025-08-12 DIAGNOSIS — F41.9 ANXIETY AND DEPRESSION: ICD-10-CM

## 2025-08-12 DIAGNOSIS — Z23 NEED FOR VACCINATION: ICD-10-CM

## 2025-08-12 LAB
BV BACTERIA DNA VAG QL NAA+PROBE: NEGATIVE
C GLABRATA DNA VAG QL NAA+PROBE: NEGATIVE
C KRUSEI DNA VAG QL NAA+PROBE: NEGATIVE
CANDIDA DNA VAG QL NAA+PROBE: NEGATIVE
HPV E6+E7 MRNA CVX QL NAA+PROBE: NEGATIVE
T VAGINALIS DNA VAG QL NAA+PROBE: NEGATIVE

## 2025-08-13 RX ORDER — DROSPIRENONE AND ETHINYL ESTRADIOL 0.02-3(28)
1 KIT ORAL DAILY
Qty: 84 EACH | Refills: 4 | Status: SHIPPED | OUTPATIENT
Start: 2025-08-13

## 2025-08-18 RX ORDER — ALBUTEROL SULFATE 90 UG/1
2 INHALANT RESPIRATORY (INHALATION) EVERY 6 HOURS PRN
Qty: 1 EACH | Refills: 2 | Status: SHIPPED | OUTPATIENT
Start: 2025-08-18

## 2025-08-18 RX ORDER — BUDESONIDE AND FORMOTEROL FUMARATE DIHYDRATE 80; 4.5 UG/1; UG/1
2 AEROSOL RESPIRATORY (INHALATION) 2 TIMES DAILY
Qty: 3 EACH | Refills: 1 | Status: SHIPPED | OUTPATIENT
Start: 2025-08-18

## (undated) DEVICE — GLOVE SUR 6.5 SENSICARE PI MIC PIP CRM PWD F

## (undated) DEVICE — GLOVE SUR 6.5 SENSICARE PI PIP GRN PWD F

## (undated) DEVICE — SAFEAIR TELESCOPIC SMOKE EVACUATION PENCIL, COATED, 70MM BLADE, PUSH BUTTON: Brand: STRYKER

## (undated) DEVICE — MEDI-VAC NON-CONDUCTIVE SUCTION TUBING: Brand: CARDINAL HEALTH

## (undated) DEVICE — MEGADYNE E-Z CLEAN BALL 5IN

## (undated) DEVICE — PENCIL ES BTTN SWCH W/ TIP HOLSTER E-Z CLN

## (undated) DEVICE — HANDLE SUR BLU PLAS LT FLX SLIP ON ST DISP

## (undated) DEVICE — Device

## (undated) DEVICE — PEN 6" SURGICAL MARKING PURPL

## (undated) DEVICE — PACK CUSTOM D

## (undated) DEVICE — SUCTION CANISTER, 3000CC,SAFELINER: Brand: DEROYAL

## (undated) DEVICE — SWAB SPEC 16IN PROCTOSCOPY RAYON TIP

## (undated) DEVICE — NEEDLE SPNL 20GA L3.5IN YEL QNCKE STYL DISP

## (undated) DEVICE — SOLUTION IRRIG 1000ML 0.9% NACL USP BTL

## (undated) DEVICE — JELLY,LUBE,STERILE,FLIP TOP,TUBE,2-OZ: Brand: MEDLINE

## (undated) DEVICE — STERILE COTTON BALLS LARGE 5/P: Brand: MEDLINE

## (undated) NOTE — ED AVS SNAPSHOT
[facility] Emergency Department    [address]     [place] [address]    Phone:  [phone]    Fax:  [phone]           Shilpi Jasso   MRN: [id number]    Department:  [facility] Emergency Department   Date of Visit:  4/ and Class Registration line at [phone] or find a doctor online by visiting [email or web address].    IF THERE IS ANY CHANGE OR WORSENING OF YOUR CONDITION, CALL YOUR PRIMARY CARE PHYSICIAN AT ONCE OR RETURN IMMEDIATELY TO [address] [place] [address].     If

## (undated) NOTE — LETTER
ASTHMA ACTION PLAN for Elizabeth Godoy     : 1990     Date: 24  Doctor:  Fredy Scott MD  Phone for doctor or clinic: [facility], [facility], [place]  [address] [address] [phone]           ACT Goal: 20 or greater    Call your provider if you require your rescue/quick reliever medication more than 2-3 times in a 24 hour period.    If you require your rescue inhaler/medication more than 2-3 times weekly, your asthma may not be under proper control and you should seek medical attention.    *Quick Relievers are Xopenex and Albuterol*    You can use the colors of a traffic light to help learn about your asthma medicines.  Therapy Range       1. Green - Go! % of Personal Best Peak Flow   Use controller medicine.   Breathing is good  No cough or wheeze  Can work and play Medicine How much to take When to take it    Medications       Sympathomimetics Instructions     albuterol 108 (90 Base) MCG/ACT Inhalation Aero Soln Inhale 2 puffs into the lungs every 6 (six) hours as needed for Wheezing or Shortness of Breath.     Budesonide-Formoterol Fumarate (SYMBICORT) 80-4.5 MCG/ACT Inhalation Aerosol Inhale 2 puffs into the lungs 2 (two) times daily.     SYMBICORT 80-4.5 MCG/ACT Inhalation Aerosol Inhale 2 puffs into the lungs 2 (two) times daily.     PROAIR  (90 Base) MCG/ACT Inhalation Aero Soln INHALE 2 PUFFS INTO THE LUNGS EVERY 6 HOURS AS NEEDED FOR WHEEZING OR SHORTNESS OF BREATH                    2. Yellow - Caution. 50-79% Personal Best Peak Flow  Use reliever medicine to keep an asthma attack from getting bad.   Cough  Quick Relievers  Wheezing  Tight Chest  Wake up at night Medicine How much to take When to take it    If symptoms are not improving in 24-48 hrs, call office for further instructions  Medications       Sympathomimetics Instructions     albuterol 108 (90 Base) MCG/ACT Inhalation Aero Soln Inhale 2 puffs into the lungs every 6 (six)  hours as needed for Wheezing or Shortness of Breath.     Budesonide-Formoterol Fumarate (SYMBICORT) 80-4.5 MCG/ACT Inhalation Aerosol Inhale 2 puffs into the lungs 2 (two) times daily.     SYMBICORT 80-4.5 MCG/ACT Inhalation Aerosol Inhale 2 puffs into the lungs 2 (two) times daily.     PROAIR  (90 Base) MCG/ACT Inhalation Aero Soln INHALE 2 PUFFS INTO THE LUNGS EVERY 6 HOURS AS NEEDED FOR WHEEZING OR SHORTNESS OF BREATH                    3. Red - Stop! Danger! <50% Personal Best Peak Flow  Continue Controller Medications But ADD:   Medicine not helping  Breathing is hard and fast  Nose opens wide  Can't walk  Ribs show  Can't talk well Medicine How much to take When to take it    If your symptoms do not improve in ONE hour -  go to the emergency room or call 911 immediately! If symptoms improve, call office for appointment immediately.    Albuterol inhaler 2 puffs every 20 minutes for three treatments       Don't forget:  Rinse mouth after using inhaler  Use spacer for inhaler  Remember to get your Flu vaccine every fall!    [x] Asthma Action Plan reviewed with the caregiver and patient, and a copy of the plan was given to the patient/caregiver.   [] Asthma Action Plan reviewed with the caregiver and patient on the phone, and copy mailed to patient/caregiver or sent via [employer].     Signatures:   Provider  Fredy Scott MD Patient  Elizabeth Jayne Caretaker

## (undated) NOTE — LETTER
ASTHMA ACTION PLAN for Gonzales Carreon     : 1990     Date: 21  Doctor:  Riley Matta MD  Phone for doctor or clinic: [address] [facility]  [address] [address] [phone] a Breath. Budesonide-Formoterol Fumarate (SYMBICORT) 80-4.5 MCG/ACT Inhalation Aerosol    Inhale 2 puffs into the lungs 2 (two) times daily.      PROAIR  (90 Base) MCG/ACT Inhalation Aero Soln    INHALE 2 PUFFS INTO THE LUNGS EVERY 6 HOURS AS NEEDE

## (undated) NOTE — LETTER
MINOR CASE LETTER      1/13/2025        Dear Elizabeth,    Your are having a LEEP on Friday, February 7th, 2025 at 8:30AM. Please arrive to the hospital 2 hours earlier.     Do not eat or drink anything (including water) after midnight the night before surgery.    If your procedure is scheduled later in the day, then nothing by mouth for 6 hours before arrival to the hospital.    You are to call this office if you have any cold or flu symptoms 2 days before your scheduled surgery.    Please avoid aspirin 7 days before surgery.  Avoid Ibuprofen, Motrin, Aleve, or Naprosyn for 3 days before surgery.    You will be contacted by PreAdmission Testing (PAT) usually within the week before surgery.  They will take a short medical history and let you know if any preoperative testing is needed.    Contact your insurance company to let them know you are having a LEEP done. If you have an HMO or EPO insurance, we will initiate the referral for you.    Please make arrangements for someone to drive you home after your surgery.    Call our office now to schedule your post-operative appointment for 2 weeks after surgery.    Please feel free to contact our office at [phone] if you have any questions regarding these instructions or your procedure.      Sincerely,    Kacey Harvey MD  [facility], [facility] - OB/GYN  [address]  [facility] [address] [phone]

## (undated) NOTE — ED AVS SNAPSHOT
[facility] Emergency Department    [address]     [place] [address]    Phone:  [phone]    Fax:  [phone]           Bonilla Graves   MRN: [id number]    Department:  [facility] Emergency Department   Date of Visit:  4/ Return to the ER for any new or worsening symptoms at any time    Discharge References/Attachments     ABDOMINAL PAIN, ADULT (ENGLISH)      Disclosure     Insurance plans vary and the physician(s) referred by the ER may not be covered by your plan.  Please CARE PHYSICIAN AT ONCE OR RETURN IMMEDIATELY TO THE EMERGENCY DEPARTMENT. If you have been prescribed any medication(s), please fill your prescription right away and begin taking the medication(s) as directed.   If you believe that any of the medications harming yourself, contact [facility] and Referral Center at [phone]. - If you don’t have insurance, John Garcia has partnered with [facility] to help you get signed up for insurance coverage.   [facility] [place]

## (undated) NOTE — LETTER
[facility] POST ANESTHESIA CARE UNIT  [address] [facility] [address]  [phone]     To whom it may concern,    Elizabeth Godoy underwent a medical procedure on February 7, 2025 and will medically require time to recover and recuperate for 1 week after the procedure. Please accommodate her in this regard.             Thank You,    Kacey Harvey MD  Obstetrics and Gynecology    9:45 AM   2/7/2025

## (undated) NOTE — LETTER
09/23/20        Kimo Reynoso  [address] [place] [address]      Dear Gena Jimenez,    [facility] records indicate that you have outstanding lab work and or testing that was ordered for you and has not yet been completed:  Orders Placed This Encounter

## (undated) NOTE — LETTER
AUTHORIZATION FOR SURGICAL OPERATION OR OTHER PROCEDURE    1. I hereby authorize Dr. Kacey Harvey, and [facility] staff assigned to my case to perform the following operation and/or procedure at the [facility] site:    LEEP PROCEDURE ___________________________________________________________________________      _______________________________________________________________________________________________    2.  My physician has explained the nature and purpose of the operation or other procedure, possible alternative methods of treatment, the risks involved, and the possibility of complication to me.  I acknowledge that no guarantee has been made as to the result that may be obtained.  3.  I recognize that, during the course of this operation, or other procedure, unforseen conditions may necessitate additional or different procedure than those listed above.  I, therefore, further authorize and request that the above named physician, his/her physician assistants or designees perform such procedures as are, in his/her professional opinion, necessary and desirable.  4.  Any tissue or organs removed in the operation or other procedure may be disposed of by and at the discretion of the [facility].  5.  I understand that in the event of a medical emergency, I will be transported by local paramedics to [facility] or other hospital emergency department.  6.  I certify that I have read and fully understand the above consent to operation and/or other procedure.    7.  I acknowledge that my physician has explained sedation/analgesia administration to me including the risks and benefits.  I consent to the administration of sedation/analgesia as may be necessary or desirable in the judgement of my physician.    Witness signature: ___________________________________________________ Date:  ______/______/_____                    Time:  ________  A.M.  P.M.       Patient Name:  ______________________________________________________  (please print)      Patient signature:  ___________________________________________________             Relationship to Patient:           []  Parent    Responsible person                          []  Spouse  In case of minor or                    [] Other  _____________   Incompetent name:  __________________________________________________                               (please print)      _____________      Responsible person  In case of minor or  Incompetent signature:  _______________________________________________    Statement of Physician  My signature below affirms that prior to the time of the procedure, I have explained to the patient and/or his/her guardian, the risks and benefits involved in the proposed treatment and any reasonable alternative to the proposed treatment.  I have also explained the risks and benefits involved in the refusal of the proposed treatment and have answered the patient's questions.                        Date:  ______/______/_______  Provider                      Signature:  __________________________________________________________       Time:  ___________ A.M    P.M.

## (undated) NOTE — LETTER
Date & Time: 2/22/2023, 9:26 AM  Patient: Jes Nipple  Encounter Provider(s):    ADAIR Augustine       To Whom It May Concern:    Jes Landeros was seen and treated in our department on 2/22/2023. She should not return to work until 2/23/2023.     If you have any questions or concerns, please do not hesitate to call.        _____________________________  Physician/APC Signature

## (undated) NOTE — LETTER
03/19/18        Mendel Coop  [address] [place] [address]      Dear Oscar Hdez,    [address] [facility] records indicate that you have outstanding lab work and or testing that was ordered for you and has not yet been completed:Date order 10/19/17  HEPATIC FUNCTI

## (undated) NOTE — LETTER
ASTHMA ACTION PLAN for Kimo Reynoso     : 1990     Date: 20  Doctor:  Felipa Antoine MD  Phone for doctor or clinic: [address]  [address] [facility] [address]  [address] [facility] [address]  [phone] Albuterol Sulfate  (90 Base) MCG/ACT Inhalation Aero Soln    Inhale 2 puffs into the lungs every 6 (six) hours as needed for Wheezing or Shortness of Breath.      Budesonide-Formoterol Fumarate (SYMBICORT) 80-4.5 MCG/ACT Inhalation Aerosol    Inhal

## (undated) NOTE — LETTER
Date & Time: 1/16/2024, 9:12 AM  Patient: Elizabeth Godoy  Encounter Provider(s):    Chuck Gerber PA       To Whom It May Concern:    Elizabeth Godoy was seen and treated in our department on 1/16/2024. She should not return to work until Monday  .    If you have any questions or concerns, please do not hesitate to call.      Chuck Gerber   _____________________________  Physician/APC Signature

## (undated) NOTE — LETTER
Date & Time: 6/17/2021, 10:50 AM  Patient: Phi Mcnamara  Encounter Provider(s):    Delisa Esparza MD       To Whom It May Concern:    Phi Mcnamara was seen and treated in our department on 6/17/2021. She can return to work 6/21/21.

## (undated) NOTE — LETTER
VACCINE ADMINISTRATION RECORD  PARENT / GUARDIAN APPROVAL  Date: 2025  Vaccine administered to: Elizabeth Godoy     : 1990    MRN: [id number]    A copy of the appropriate Centers for Disease Control and Prevention Vaccine Information statement has been provided. I have read or have had explained the information about the diseases and the vaccines listed below. There was an opportunity to ask questions and any questions were answered satisfactorily. I believe that I understand the benefits and risks of the vaccine cited and ask that the vaccine(s) listed below be given to me or to the person named above (for whom I am authorized to make this request).    VACCINES ADMINISTERED:  Gardasil    I have read and hereby agree to be bound by the terms of this agreement as stated above. My signature is valid until revoked by me in writing.  This document is signed by Elizabeth Godoy , relationship: Self on 2025.:                                                                                                                                         Parent / Guardian Signature                                                Date

## (undated) NOTE — MR AVS SNAPSHOT
[address] at [address] [address] [phone]               Thank you for choosing us for your health care visit with Felipa Antoine MD.  We are glad to serve you and happy to provide you with this authorization, such as [place], please feel free to schedule your appointment immediately. However, if you are unsure about the requirements for authorization, please wait 5-7 days and then contact your physician's office.  At that time, you will These medications were sent to [address] [place] [address] - [address] [facility], [phone]  [address] [place] [address] [address] [place] [address]     Phone:  [phone]    - Ciprofloxacin HCl 500 MG Tabs  - metRONIDAZOLE 500 MG Tabs            Results active are less likely to develop some chronic diseases than adults who are inactive.      HOW TO GET STARTED: HOW TO STAY MOTIVATED:   Start activities slowly and build up over time Do what you like   Get your heart pumping – brisk walking, biking, swimmin

## (undated) NOTE — LETTER
ASTHMA ACTION PLAN for Drew Gill     : 1990     Date: 3/27/2018  Provider:  Molly Song MD  Phone for doctor or clinic: [address] [facility].  [place] [address] [phone]

## (undated) NOTE — LETTER
AUTHORIZATION FOR SURGICAL OPERATION OR OTHER PROCEDURE    1. I hereby authorize Dr. Harvey, and [facility] staff assigned to my case to perform the following operation and/or procedure at the [facility] site:    Colposcopy with cervical Biopsy     2.  My physician has explained the nature and purpose of the operation or other procedure, possible alternative methods of treatment, the risks involved, and the possibility of complication to me.  I acknowledge that no guarantee has been made as to the result that may be obtained.  3.  I recognize that, during the course of this operation, or other procedure, unforseen conditions may necessitate additional or different procedure than those listed above.  I, therefore, further authorize and request that the above named physician, his/her physician assistants or designees perform such procedures as are, in his/her professional opinion, necessary and desirable.  4.  Any tissue or organs removed in the operation or other procedure may be disposed of by and at the discretion of the [facility] and [facility].  5.  I understand that in the event of a medical emergency, I will be transported by local paramedics to [facility] or other hospital emergency department.  6.  I certify that I have read and fully understand the above consent to operation and/or other procedure.    7.  I acknowledge that my physician has explained sedation/analgesia administration to me including the risks and benefits.  I consent to the administration of sedation/analgesia as may be necessary or desirable in the judgement of my physician.    Witness signature: ___________________________________________________ Date:  ______/______/_____                    Time:  ________ A.M.  P.M.       Patient Name:  ______________________________________________________  (please print)      Patient signature:   ___________________________________________________             Relationship to Patient:           []  Parent    Responsible person                          []  Spouse  In case of minor or                    [] Other  _____________   Incompetent name:  __________________________________________________                               (please print)      _____________      Responsible person  In case of minor or  Incompetent signature:  _______________________________________________    Statement of Physician  My signature below affirms that prior to the time of the procedure, I have explained to the patient and/or his/her guardian, the risks and benefits involved in the proposed treatment and any reasonable alternative to the proposed treatment.  I have also explained the risks and benefits involved in the refusal of the proposed treatment and have answered the patient's questions.                        Date:  ______/______/_______  Provider                      Signature:  __________________________________________________________       Time:  ___________ A.M    P.M.